# Patient Record
Sex: MALE | Race: BLACK OR AFRICAN AMERICAN | NOT HISPANIC OR LATINO | Employment: FULL TIME | URBAN - METROPOLITAN AREA
[De-identification: names, ages, dates, MRNs, and addresses within clinical notes are randomized per-mention and may not be internally consistent; named-entity substitution may affect disease eponyms.]

---

## 2022-10-03 ENCOUNTER — HOSPITAL ENCOUNTER (EMERGENCY)
Facility: HOSPITAL | Age: 22
Discharge: HOME | End: 2022-10-03
Attending: FAMILY MEDICINE
Payer: MEDICAID

## 2022-10-03 VITALS
HEART RATE: 65 BPM | SYSTOLIC BLOOD PRESSURE: 145 MMHG | TEMPERATURE: 99 F | OXYGEN SATURATION: 100 % | RESPIRATION RATE: 23 BRPM | DIASTOLIC BLOOD PRESSURE: 96 MMHG

## 2022-10-03 DIAGNOSIS — R55 SYNCOPE AND COLLAPSE: ICD-10-CM

## 2022-10-03 LAB
ALBUMIN SERPL-MCNC: 4.7 GM/DL (ref 3.5–5)
ALBUMIN/GLOB SERPL: 1.4 RATIO (ref 1.1–2)
ALP SERPL-CCNC: 67 UNIT/L (ref 40–150)
ALT SERPL-CCNC: 24 UNIT/L (ref 0–55)
AMPHET UR QL SCN: NEGATIVE
APPEARANCE UR: CLEAR
AST SERPL-CCNC: 38 UNIT/L (ref 5–34)
BACTERIA #/AREA URNS AUTO: ABNORMAL /HPF
BARBITURATE SCN PRESENT UR: NEGATIVE
BASOPHILS # BLD AUTO: 0.03 X10(3)/MCL (ref 0–0.2)
BASOPHILS NFR BLD AUTO: 0.5 %
BENZODIAZ UR QL SCN: NEGATIVE
BILIRUB UR QL STRIP.AUTO: NEGATIVE MG/DL
BILIRUBIN DIRECT+TOT PNL SERPL-MCNC: 1.8 MG/DL
BUN SERPL-MCNC: 11.9 MG/DL (ref 8.9–20.6)
CALCIUM SERPL-MCNC: 10.1 MG/DL (ref 8.4–10.2)
CANNABINOIDS UR QL SCN: POSITIVE
CHLORIDE SERPL-SCNC: 99 MMOL/L (ref 98–107)
CK MB SERPL-MCNC: 1 NG/ML
CK SERPL-CCNC: 588 U/L (ref 30–200)
CO2 SERPL-SCNC: 26 MMOL/L (ref 22–29)
COCAINE UR QL SCN: NEGATIVE
COLOR UR AUTO: YELLOW
CREAT SERPL-MCNC: 1.11 MG/DL (ref 0.73–1.18)
EOSINOPHIL # BLD AUTO: 0.04 X10(3)/MCL (ref 0–0.9)
EOSINOPHIL NFR BLD AUTO: 0.7 %
ERYTHROCYTE [DISTWIDTH] IN BLOOD BY AUTOMATED COUNT: 12.9 % (ref 11.5–17)
ETHANOL SERPL-MCNC: 15 MG/DL
FENTANYL UR QL SCN: NEGATIVE
GFR SERPLBLD CREATININE-BSD FMLA CKD-EPI: >60 MLS/MIN/1.73/M2
GLOBULIN SER-MCNC: 3.3 GM/DL (ref 2.4–3.5)
GLUCOSE SERPL-MCNC: 86 MG/DL (ref 74–100)
GLUCOSE UR QL STRIP.AUTO: NORMAL MG/DL
HCT VFR BLD AUTO: 42 % (ref 42–52)
HGB BLD-MCNC: 14.7 GM/DL (ref 14–18)
HYALINE CASTS #/AREA URNS LPF: ABNORMAL /LPF
IMM GRANULOCYTES # BLD AUTO: 0.01 X10(3)/MCL (ref 0–0.04)
IMM GRANULOCYTES NFR BLD AUTO: 0.2 %
KETONES UR QL STRIP.AUTO: ABNORMAL MG/DL
LEUKOCYTE ESTERASE UR QL STRIP.AUTO: NEGATIVE UNIT/L
LYMPHOCYTES # BLD AUTO: 2.56 X10(3)/MCL (ref 0.6–4.6)
LYMPHOCYTES NFR BLD AUTO: 44.4 %
MCH RBC QN AUTO: 30.8 PG (ref 27–31)
MCHC RBC AUTO-ENTMCNC: 35 MG/DL (ref 33–36)
MCV RBC AUTO: 87.9 FL (ref 80–94)
MDMA UR QL SCN: NEGATIVE
MONOCYTES # BLD AUTO: 0.42 X10(3)/MCL (ref 0.1–1.3)
MONOCYTES NFR BLD AUTO: 7.3 %
MUCOUS THREADS URNS QL MICRO: ABNORMAL /LPF
NEUTROPHILS # BLD AUTO: 2.7 X10(3)/MCL (ref 2.1–9.2)
NEUTROPHILS NFR BLD AUTO: 46.9 %
NITRITE UR QL STRIP.AUTO: NEGATIVE
NRBC BLD AUTO-RTO: 0 %
OPIATES UR QL SCN: NEGATIVE
PCP UR QL: NEGATIVE
PH UR STRIP.AUTO: 6.5 [PH]
PH UR: 6.5 [PH] (ref 3–11)
PLATELET # BLD AUTO: 208 X10(3)/MCL (ref 130–400)
PMV BLD AUTO: 9.8 FL (ref 7.4–10.4)
POCT GLUCOSE: 70 MG/DL (ref 70–110)
POTASSIUM SERPL-SCNC: 3.1 MMOL/L (ref 3.5–5.1)
PROT SERPL-MCNC: 8 GM/DL (ref 6.4–8.3)
PROT UR QL STRIP.AUTO: ABNORMAL MG/DL
RBC # BLD AUTO: 4.78 X10(6)/MCL (ref 4.7–6.1)
RBC #/AREA URNS AUTO: ABNORMAL /HPF
RBC UR QL AUTO: ABNORMAL UNIT/L
SODIUM SERPL-SCNC: 140 MMOL/L (ref 136–145)
SP GR UR STRIP.AUTO: 1.02
SPECIFIC GRAVITY, URINE AUTO (.000) (OHS): 1.02 (ref 1–1.03)
SQUAMOUS #/AREA URNS LPF: ABNORMAL /HPF
TROPONIN I SERPL-MCNC: <0.01 NG/ML (ref 0–0.04)
UROBILINOGEN UR STRIP-ACNC: ABNORMAL MG/DL
WBC # SPEC AUTO: 5.8 X10(3)/MCL (ref 4.5–11.5)
WBC #/AREA URNS AUTO: ABNORMAL /HPF

## 2022-10-03 PROCEDURE — 81001 URINALYSIS AUTO W/SCOPE: CPT | Performed by: NURSE PRACTITIONER

## 2022-10-03 PROCEDURE — 82550 ASSAY OF CK (CPK): CPT | Performed by: NURSE PRACTITIONER

## 2022-10-03 PROCEDURE — 96361 HYDRATE IV INFUSION ADD-ON: CPT

## 2022-10-03 PROCEDURE — 82553 CREATINE MB FRACTION: CPT | Performed by: NURSE PRACTITIONER

## 2022-10-03 PROCEDURE — 63600175 PHARM REV CODE 636 W HCPCS: Performed by: PHYSICIAN ASSISTANT

## 2022-10-03 PROCEDURE — 99285 EMERGENCY DEPT VISIT HI MDM: CPT | Mod: 25

## 2022-10-03 PROCEDURE — 84484 ASSAY OF TROPONIN QUANT: CPT | Performed by: NURSE PRACTITIONER

## 2022-10-03 PROCEDURE — 96374 THER/PROPH/DIAG INJ IV PUSH: CPT

## 2022-10-03 PROCEDURE — 82962 GLUCOSE BLOOD TEST: CPT | Mod: 59

## 2022-10-03 PROCEDURE — 25000003 PHARM REV CODE 250: Performed by: PHYSICIAN ASSISTANT

## 2022-10-03 PROCEDURE — 93005 ELECTROCARDIOGRAM TRACING: CPT

## 2022-10-03 PROCEDURE — 25000003 PHARM REV CODE 250: Performed by: NURSE PRACTITIONER

## 2022-10-03 PROCEDURE — 85025 COMPLETE CBC W/AUTO DIFF WBC: CPT | Performed by: NURSE PRACTITIONER

## 2022-10-03 PROCEDURE — 80307 DRUG TEST PRSMV CHEM ANLYZR: CPT | Performed by: NURSE PRACTITIONER

## 2022-10-03 PROCEDURE — 82077 ASSAY SPEC XCP UR&BREATH IA: CPT | Performed by: NURSE PRACTITIONER

## 2022-10-03 PROCEDURE — 80053 COMPREHEN METABOLIC PANEL: CPT | Performed by: NURSE PRACTITIONER

## 2022-10-03 PROCEDURE — 36415 COLL VENOUS BLD VENIPUNCTURE: CPT | Performed by: NURSE PRACTITIONER

## 2022-10-03 RX ORDER — POTASSIUM CHLORIDE 20 MEQ/1
40 TABLET, EXTENDED RELEASE ORAL
Status: COMPLETED | OUTPATIENT
Start: 2022-10-03 | End: 2022-10-03

## 2022-10-03 RX ORDER — DICLOFENAC SODIUM 50 MG/1
50 TABLET, DELAYED RELEASE ORAL 2 TIMES DAILY PRN
Qty: 20 TABLET | Refills: 0 | Status: SHIPPED | OUTPATIENT
Start: 2022-10-03 | End: 2023-02-16 | Stop reason: SDUPTHER

## 2022-10-03 RX ORDER — KETOROLAC TROMETHAMINE 30 MG/ML
15 INJECTION, SOLUTION INTRAMUSCULAR; INTRAVENOUS
Status: COMPLETED | OUTPATIENT
Start: 2022-10-03 | End: 2022-10-03

## 2022-10-03 RX ADMIN — POTASSIUM CHLORIDE 40 MEQ: 1500 TABLET, EXTENDED RELEASE ORAL at 11:10

## 2022-10-03 RX ADMIN — KETOROLAC TROMETHAMINE 15 MG: 30 INJECTION, SOLUTION INTRAMUSCULAR; INTRAVENOUS at 11:10

## 2022-10-03 RX ADMIN — SODIUM CHLORIDE 1000 ML: 9 INJECTION, SOLUTION INTRAVENOUS at 08:10

## 2022-10-04 NOTE — DISCHARGE INSTRUCTIONS
Returns to the ED immediately with any concerning symptoms.  Follow-up with your primary care provider within 1-2 days.

## 2022-10-04 NOTE — ED PROVIDER NOTES
"Encounter Date: 10/3/2022       History     Chief Complaint   Patient presents with    Loss of Consciousness     Friend reports patient had a 30 second- 1 min episode of syncope while riding in his car. Denies drug use; light alcohol use tonight. Reports being "hungover" all day yesterday and did not eat. CBG assessed in triage.     Pt is a 22 y.o. male who presents to the [facility] ED after experiencing approx 1 minute of "being out" per bystander. Pt was riding in his friend's car when issue happened which was within the last 30 minutes. Pt awake, alert. Denies chest pain, SOB, weakness, dizziness, or loss of bowel or bladder control. Pt admits to "heavy" alcohol use a few days ago and has been "hung over."      Review of patient's allergies indicates:  No Known Allergies  History reviewed. No pertinent past medical history.  History reviewed. No pertinent surgical history.  History reviewed. No pertinent family history.  Social History     Tobacco Use    Smoking status: Every Day     Types: Vaping with nicotine    Smokeless tobacco: Never     Review of Systems   Constitutional:  Negative for chills, diaphoresis, fatigue and fever.   HENT:  Negative for facial swelling, postnasal drip, rhinorrhea, sinus pressure, sinus pain, sore throat and trouble swallowing.    Respiratory:  Negative for cough, chest tightness, shortness of breath and wheezing.    Cardiovascular:  Negative for chest pain, palpitations and leg swelling.   Gastrointestinal:  Negative for abdominal pain, diarrhea, nausea and vomiting.   Genitourinary:  Negative for dysuria, flank pain, hematuria and urgency.   Musculoskeletal:  Negative for arthralgias, back pain and myalgias.   Skin:  Negative for color change and rash.   Neurological:  Positive for syncope. Negative for dizziness, weakness and headaches.   Hematological:  Does not bruise/bleed easily.   All other systems reviewed and are negative.    Physical Exam     Initial Vitals   BP Pulse Resp " Temp SpO2   10/03/22 2100 10/03/22 2100 10/03/22 2100 10/03/22 2101 10/03/22 2100   (!) 132/97 63 20 98.9 °F (37.2 °C) 100 %      MAP       --                Physical Exam    Nursing note and vitals reviewed.  Constitutional: Vital signs are normal. He appears well-developed and well-nourished.   HENT:   Head: Normocephalic.   Nose: Nose normal.   Mouth/Throat: Oropharynx is clear and moist.   Eyes: Conjunctivae and EOM are normal. Pupils are equal, round, and reactive to light.   Neck: Neck supple.   Normal range of motion.  Cardiovascular:  Normal rate, regular rhythm, normal heart sounds and intact distal pulses.           Pulmonary/Chest: Effort normal and breath sounds normal. No respiratory distress. He has no wheezes. He has no rhonchi. He has no rales. He exhibits no tenderness.   Abdominal: Abdomen is soft and flat. Bowel sounds are normal. There is no abdominal tenderness. There is no rebound, no guarding, no tenderness at McBurney's point and negative Wahl's sign.   Musculoskeletal:         General: Normal range of motion.      Cervical back: Normal range of motion and neck supple.     Neurological: He is alert and oriented to person, place, and time. He has normal strength.   Skin: Skin is warm and dry. Capillary refill takes less than 2 seconds.   Psychiatric: He has a normal mood and affect. His behavior is normal. Judgment and thought content normal.       ED Course   Procedures  Labs Reviewed   COMPREHENSIVE METABOLIC PANEL - Abnormal; Notable for the following components:       Result Value    Potassium Level 3.1 (*)     Bilirubin Total 1.8 (*)     Aspartate Aminotransferase 38 (*)     All other components within normal limits   URINALYSIS, REFLEX TO URINE CULTURE - Abnormal; Notable for the following components:    Protein, UA 1+ (*)     Ketones, UA 1+ (*)     Blood, UA 1+ (*)     Urobilinogen, UA 1+ (*)     Mucous, UA Occ (*)     All other components within normal limits   DRUG SCREEN, URINE  (BEAKER) - Abnormal; Notable for the following components:    Cannabinoids, Urine Positive (*)     All other components within normal limits    Narrative:     Cut off concentrations:    Amphetamines - 1000 ng/ml  Barbiturates - 200 ng/ml  Benzodiazepine - 200 ng/ml  Cannabinoids (THC) - 50 ng/ml  Cocaine - 300 ng/ml  Fentanyl - 1.0 ng/ml  MDMA - 500 ng/ml  Opiates - 300 ng/ml   Phencyclidine (PCP) - 25 ng/ml    Specimen submitted for drug analysis and tested for pH and specific gravity in order to evaluate sample integrity. Suspect tampering if specific gravity is <1.003 and/or pH is not within the range of 4.5 - 8.0  False negatives may result form substances such as bleach added to urine.  False positives may result for the presence of a substance with similar chemical structure to the drug or its metabolite.    This test provides only a PRELIMINARY analytical test result. A more specific alternate chemical method must be used in order to obtain a confirmed analytical result. Gas chromatography/mass spectrometry (GC/MS) is the preferred confirmatory method. Other chemical confirmation methods are available. Clinical consideration and professional judgement should be applied to any drug of abuse test result, particularly when preliminary positive results are used.    Positive results will be confirmed only at the physicians request. Unconfirmed screening results are to be used only for medical purposes (treatment).        ALCOHOL,MEDICAL (ETHANOL) - Abnormal; Notable for the following components:    Ethanol Level 15.0 (*)     All other components within normal limits   CK - Abnormal; Notable for the following components:    Creatine Kinase 588 (*)     All other components within normal limits   TROPONIN I - Normal   CK-MB - Normal   CBC W/ AUTO DIFFERENTIAL    Narrative:     The following orders were created for panel order CBC auto differential.  Procedure                               Abnormality         Status                      ---------                               -----------         ------                     CBC with Differential[[id number]]                            Final result                 Please view results for these tests on the individual orders.   CBC WITH DIFFERENTIAL   EXTRA TUBES    Narrative:     The following orders were created for panel order EXTRA TUBES.  Procedure                               Abnormality         Status                     ---------                               -----------         ------                     Light Blue Top Hold[[id number]]                              In process                   Please view results for these tests on the individual orders.   LIGHT BLUE TOP HOLD   POCT GLUCOSE        ECG Results              EKG 12-lead (Final result)  Result time 10/04/22 16:13:24      Final result by Interface, Lab [facility] (10/04/22 16:13:24)                   Narrative:    Test Reason : R55,    Vent. Rate : 059 BPM     Atrial Rate : 059 BPM     P-R Int : 160 ms          QRS Dur : 074 ms      QT Int : 376 ms       P-R-T Axes : -07 065 050 degrees     QTc Int : 372 ms    Sinus bradycardia  Moderate voltage criteria for LVH, may be normal variant  Borderline Abnormal ECG  No previous ECGs available  Confirmed by Sharon Bridges MD ([id number]) on 10/4/2022 4:13:15 PM    Referred By: [email or web address]   SELF           Confirmed By:Sharon Bridges MD                                  Imaging Results              CT Head Without Contrast (Final result)  Result time 10/03/22 21:20:53      Final result by Ten Barrientos MD (10/03/22 21:20:53)                   Impression:      No acute abnormalities are seen      Electronically signed by: Ten Barrientos MD  Date:    10/03/2022  Time:    21:20               Narrative:    EXAMINATION:  CT HEAD WITHOUT CONTRAST    CLINICAL HISTORY:  Mental status change, unknown cause;    TECHNIQUE:  Low dose axial images were obtained through the head.  Coronal and  sagittal reformations were also performed. Contrast was not administered.    Automatic exposure control (AEC) was utilized for dose reduction.    Dose: 1073 mGycm    COMPARISON:  None.    FINDINGS:  Ventricles of normal size and shape there is no shift of the midline noted.  There are no extra-axial fluid collections or areas consistent with hemorrhage noted.  No masses is seen no acute infarcts are noted.  The calvarium appears intact.                                       Medications   sodium chloride 0.9% bolus 1,000 mL (0 mLs Intravenous Stopped 10/3/22 2159)   ketorolac injection 15 mg (15 mg Intravenous Given 10/3/22 2327)   potassium chloride SA CR tablet 40 mEq (40 mEq Oral Given 10/3/22 2327)     Medical Decision Making:   Differential Diagnosis:   Syncope  Electrolyte imbalance  AMI  Drug abuse  ETOH use  Clinical Tests:   Lab Tests: Reviewed and Ordered  Radiological Study: Ordered and Reviewed  Medical Tests: Ordered and Reviewed  ED Management:  The patient is resting comfortably and in no acute distress.  He states that his symptoms have improved after treatment in Emergency Department. I personally discussed his test results and treatment plan.  Gave strict ED precautions and specific conditions for return to the emergency department and importance of follow up with pcp.  Patient voices understanding and agrees to the plan discussed. All patients' questions have been answered at this time. He has remained hemodynamically stable throughout entire stay in ED and is stable for discharge home.            ED Course as of 10/04/22 2106   Mon Oct 03, 2022   2044 Pt transitioned to GLENDA ARCHIBALD at this time. [JA]   2140 AST(!): 38 [ER]   2140 CT Head Without Contrast  No acute abnormalities are seen [ER]      ED Course User Index  [ER] CRISTELA Poole  [JA] Glen Vilchis Jr., FNP                 Clinical Impression:   Final diagnoses:  [R55] Syncope and collapse      ED Disposition Condition    Discharge  Stable          ED Prescriptions       Medication Sig Dispense Start Date End Date Auth. Provider    diclofenac (VOLTAREN) 50 MG EC tablet Take 1 tablet (50 mg total) by mouth 2 (two) times daily as needed (pain). 20 tablet 10/3/2022 -- CRISTELA Poole          Follow-up Information       Follow up With Specialties Details Why Contact Info    [facility] - Emergency Dept Emergency Medicine  As needed, If symptoms worsen [address] [facility] [address] [phone]             CRISTELA Poole  10/04/22 2852

## 2023-02-16 ENCOUNTER — HOSPITAL ENCOUNTER (EMERGENCY)
Facility: HOSPITAL | Age: 23
Discharge: HOME | End: 2023-02-16
Attending: EMERGENCY MEDICINE
Payer: MEDICAID

## 2023-02-16 VITALS
DIASTOLIC BLOOD PRESSURE: 85 MMHG | TEMPERATURE: 98 F | HEIGHT: 77 IN | WEIGHT: 152.13 LBS | OXYGEN SATURATION: 100 % | HEART RATE: 61 BPM | RESPIRATION RATE: 16 BRPM | BODY MASS INDEX: 17.96 KG/M2 | SYSTOLIC BLOOD PRESSURE: 129 MMHG

## 2023-02-16 DIAGNOSIS — R55 SYNCOPE: Primary | ICD-10-CM

## 2023-02-16 LAB
ALBUMIN SERPL-MCNC: 4.6 G/DL (ref 3.5–5)
ALBUMIN/GLOB SERPL: 1.3 RATIO (ref 1.1–2)
ALP SERPL-CCNC: 77 UNIT/L (ref 40–150)
ALT SERPL-CCNC: 31 UNIT/L (ref 0–55)
AST SERPL-CCNC: 54 UNIT/L (ref 5–34)
BASOPHILS # BLD AUTO: 0.03 X10(3)/MCL (ref 0–0.2)
BASOPHILS NFR BLD AUTO: 0.6 %
BILIRUBIN DIRECT+TOT PNL SERPL-MCNC: 1.1 MG/DL
BNP BLD-MCNC: <10 PG/ML
BUN SERPL-MCNC: 11.4 MG/DL (ref 8.9–20.6)
CALCIUM SERPL-MCNC: 9.8 MG/DL (ref 8.4–10.2)
CHLORIDE SERPL-SCNC: 102 MMOL/L (ref 98–107)
CO2 SERPL-SCNC: 25 MMOL/L (ref 22–29)
CREAT SERPL-MCNC: 1.06 MG/DL (ref 0.73–1.18)
EOSINOPHIL # BLD AUTO: 0.04 X10(3)/MCL (ref 0–0.9)
EOSINOPHIL NFR BLD AUTO: 0.8 %
ERYTHROCYTE [DISTWIDTH] IN BLOOD BY AUTOMATED COUNT: 12.9 % (ref 11.5–17)
GFR SERPLBLD CREATININE-BSD FMLA CKD-EPI: >60 MLS/MIN/1.73/M2
GLOBULIN SER-MCNC: 3.5 GM/DL (ref 2.4–3.5)
GLUCOSE SERPL-MCNC: 87 MG/DL (ref 74–100)
HCT VFR BLD AUTO: 42.6 % (ref 42–52)
HGB BLD-MCNC: 14.9 GM/DL (ref 14–18)
IMM GRANULOCYTES # BLD AUTO: 0.01 X10(3)/MCL (ref 0–0.04)
IMM GRANULOCYTES NFR BLD AUTO: 0.2 %
LYMPHOCYTES # BLD AUTO: 1.13 X10(3)/MCL (ref 0.6–4.6)
LYMPHOCYTES NFR BLD AUTO: 22.2 %
MAGNESIUM SERPL-MCNC: 1.9 MG/DL (ref 1.6–2.6)
MCH RBC QN AUTO: 32 PG
MCHC RBC AUTO-ENTMCNC: 35 MG/DL (ref 33–36)
MCV RBC AUTO: 91.4 FL (ref 80–94)
MONOCYTES # BLD AUTO: 0.25 X10(3)/MCL (ref 0.1–1.3)
MONOCYTES NFR BLD AUTO: 4.9 %
NEUTROPHILS # BLD AUTO: 3.63 X10(3)/MCL (ref 2.1–9.2)
NEUTROPHILS NFR BLD AUTO: 71.3 %
NRBC BLD AUTO-RTO: 0 %
PLATELET # BLD AUTO: 234 X10(3)/MCL (ref 130–400)
PMV BLD AUTO: 10.2 FL (ref 7.4–10.4)
POTASSIUM SERPL-SCNC: 3.8 MMOL/L (ref 3.5–5.1)
PROT SERPL-MCNC: 8.1 GM/DL (ref 6.4–8.3)
RBC # BLD AUTO: 4.66 X10(6)/MCL (ref 4.7–6.1)
SODIUM SERPL-SCNC: 140 MMOL/L (ref 136–145)
TROPONIN I SERPL-MCNC: <0.01 NG/ML (ref 0–0.04)
WBC # SPEC AUTO: 5.1 X10(3)/MCL (ref 4.5–11.5)

## 2023-02-16 PROCEDURE — 99285 EMERGENCY DEPT VISIT HI MDM: CPT | Mod: 25

## 2023-02-16 PROCEDURE — 83735 ASSAY OF MAGNESIUM: CPT | Performed by: PHYSICIAN ASSISTANT

## 2023-02-16 PROCEDURE — 93005 ELECTROCARDIOGRAM TRACING: CPT

## 2023-02-16 PROCEDURE — 84484 ASSAY OF TROPONIN QUANT: CPT | Performed by: PHYSICIAN ASSISTANT

## 2023-02-16 PROCEDURE — 80053 COMPREHEN METABOLIC PANEL: CPT | Performed by: PHYSICIAN ASSISTANT

## 2023-02-16 PROCEDURE — 85025 COMPLETE CBC W/AUTO DIFF WBC: CPT | Performed by: PHYSICIAN ASSISTANT

## 2023-02-16 PROCEDURE — 83880 ASSAY OF NATRIURETIC PEPTIDE: CPT | Performed by: PHYSICIAN ASSISTANT

## 2023-02-16 RX ORDER — DICLOFENAC SODIUM 50 MG/1
50 TABLET, DELAYED RELEASE ORAL 2 TIMES DAILY PRN
Qty: 20 TABLET | Refills: 0 | Status: SHIPPED | OUTPATIENT
Start: 2023-02-16

## 2023-02-16 NOTE — ED PROVIDER NOTES
Encounter Date: 2/16/2023       History     Chief Complaint   Patient presents with    Headache     PT W CO HA, SOB, BODY ACHES AND WEAKNESS X 2 HRS. STATES GOT DRUNK LAST NIGHT.  NO FEVER OR COUGH.  VSS.      21 yo M w/ no significant PMHx presents to ED c/o unwitnessed syncopal episode w/ associated HA, SOB, body aches & generalized weakness. Patient reports getting drunk last night, but does not believe his symptoms are related to that. Reports he is unsure how long he was unconscious, but upon waking up he called his friend who brought him to ED. Has small abrasion to L anterior knee which he reports happened w/ syncopal episode. Denies pain or known injury at any other body sites. Denies vision changes, slurred speech, facial drooping, vertigo, lightheadedness, neck stiffness, focal weakness, paralysis, paresthesia, numbness, ataxia, abnormal balance, seizure, CP, palpitations, diaphoresis, cough, hemoptysis, wheezing, congestion, rhinorrhea, sore throat, F/C, abdominal pain, N/V/D, blood in stool, back pain. VSS on arrival, patient in NAD.    Review of patient's allergies indicates:  No Known Allergies  History reviewed. No pertinent past medical history.  History reviewed. No pertinent surgical history.  History reviewed. No pertinent family history.  Social History     Tobacco Use    Smoking status: Every Day     Types: Vaping with nicotine    Smokeless tobacco: Never   Substance Use Topics    Alcohol use: Yes     Review of Systems   All other systems reviewed and are negative.    Physical Exam     Initial Vitals [02/16/23 1452]   BP Pulse Resp Temp SpO2   129/85 61 16 98.4 °F (36.9 °C) 100 %      MAP       --         Physical Exam    Nursing note and vitals reviewed.  Constitutional: He appears well-developed and well-nourished. He is not diaphoretic. No distress.   HENT:   Head: Normocephalic and atraumatic. Head is without raccoon's eyes, without Shah's sign, without abrasion, without contusion and  without laceration.   Right Ear: No drainage. No hemotympanum.   Left Ear: No drainage. No hemotympanum.   Nose: No rhinorrhea. No epistaxis.   Mouth/Throat: No oropharyngeal exudate.   Eyes: Conjunctivae and EOM are normal. Pupils are equal, round, and reactive to light. No scleral icterus.   Neck: Neck supple. No JVD present.   Normal range of motion.   Full passive range of motion without pain.     Cardiovascular:  Normal rate, regular rhythm, normal heart sounds and intact distal pulses.     Exam reveals no gallop and no friction rub.       No murmur heard.  Pulmonary/Chest: Breath sounds normal. No respiratory distress. He has no wheezes. He has no rhonchi. He has no rales.   Abdominal: Abdomen is soft. Bowel sounds are normal. He exhibits no distension. There is no abdominal tenderness. There is no rebound and no guarding.   Musculoskeletal:         General: No edema. Normal range of motion.      Cervical back: Normal, full passive range of motion without pain, normal range of motion and neck supple. No rigidity. Normal range of motion.      Thoracic back: Normal.      Lumbar back: Normal.      Left knee: No swelling, deformity, effusion, erythema, ecchymosis, lacerations, bony tenderness or crepitus. Normal range of motion. Tenderness (mild at site of abrasion) present. Normal alignment.      Comments: Small abrasion to anterior L knee     Lymphadenopathy:     He has no cervical adenopathy.   Neurological: He is alert and oriented to person, place, and time. He has normal strength. He is not disoriented. He displays no tremor. No cranial nerve deficit or sensory deficit. He exhibits normal muscle tone. He displays a negative Romberg sign. He displays no seizure activity. Coordination and gait normal. GCS eye subscore is 4. GCS verbal subscore is 5. GCS motor subscore is 6.   Skin: Skin is warm and dry. Capillary refill takes less than 2 seconds. No rash noted. No erythema. No pallor.   Psychiatric: He has a  normal mood and affect.       ED Course   Procedures  Labs Reviewed   COMPREHENSIVE METABOLIC PANEL - Abnormal; Notable for the following components:       Result Value    Aspartate Aminotransferase 54 (*)     All other components within normal limits   CBC WITH DIFFERENTIAL - Abnormal; Notable for the following components:    RBC 4.66 (*)     All other components within normal limits   MAGNESIUM - Normal   B-TYPE NATRIURETIC PEPTIDE - Normal   TROPONIN I - Normal   CBC W/ AUTO DIFFERENTIAL    Narrative:     The following orders were created for panel order CBC Auto Differential.  Procedure                               Abnormality         Status                     ---------                               -----------         ------                     CBC with Differential[[id number]]        Abnormal            Final result                 Please view results for these tests on the individual orders.   EXTRA TUBES    Narrative:     The following orders were created for panel order EXTRA TUBES.  Procedure                               Abnormality         Status                     ---------                               -----------         ------                     Light Blue Top Hold[[id number]]                              In process                 Gold Top Hold[[id number]]                                    In process                   Please view results for these tests on the individual orders.   LIGHT BLUE TOP HOLD   GOLD TOP HOLD     EKG Readings: (Independently Interpreted)   Initial Reading: No STEMI. Previous EKG: Compared with most recent EKG Previous EKG Date: 10/3/22. Rhythm: Normal Sinus Rhythm. Heart Rate: 60. Ectopy: No Ectopy. Conduction: Normal. ST Segments: Normal ST Segments. Axis: Normal. Clinical Impression: Normal Sinus Rhythm     Imaging Results              X-Ray Chest 1 View (Final result)  Result time 02/16/23 16:09:59      Final result by Micaela Montenegro MD (02/16/23 16:09:59)                    Impression:      No acute cardiopulmonary abnormality.      Electronically signed by: Micaela Montenegro  Date:    02/16/2023  Time:    16:09               Narrative:    EXAMINATION:  XR CHEST 1 VIEW    CLINICAL HISTORY:  SOB;    TECHNIQUE:  Single frontal view of the chest was performed.    COMPARISON:  None    FINDINGS:  LINES AND TUBES: None    MEDIASTINUM AND CORRIE: The cardiac silhouette is normal.    LUNGS: No lobar consolidation. No edema.    PLEURA:No pleural effusion. No pneumothorax.    OTHER: No acute osseous abnormality.                                       Medications - No data to display  Medical Decision Making:   Clinical Tests:   Lab Tests: Ordered and Reviewed  Radiological Study: Ordered and Reviewed  Medical Tests: Ordered and Reviewed  Today's workup wholly unremarkable. EKG non-ischemic. Troponin WNL. BNP WNL. Low-risk HEART & DEYNAIRA scores, no indication for admission for ACS workup. PERC score of 0, no indication for PE workup. CBC unremarkable w/o significant anemia. CMP & Mg all WNL. Although patient reported SOB in triage, he has SpO2 of 100% w/o tachycardia, tachypnea or physical exam consistent w/ SOB. Patient does not meet criteria for admission under SF Syncope rules. Patient has benign neuro exam w/o focal deficits or meningeal signs, no indication for head imaging at this time. Patient non-toxic appearing w/ stable vitals, stable for discharge from ED. Will refer patient to IM clinic to establish PCP. Will discharge w/ NSAID for symptom relief & discussed importance of good diet & hydration. Discussed use of EtOH in a responsible manner. ED precautions given for new or worsening symptoms.  Additional MDM:   PERC Rule:   Age is greater than or equal to 50 = 0.0  Heart Rate is greater than or equal to 100 = 0.0  SaO2 on room air < 95% = 0.0  Unilateral leg swelling = 0.0  Hemoptysis = 0.0  Recent surgery or trauma = 0.0  Prior PE or DVT =  0.0  Hormone use = 0.00  PERC Score =  0  Heart Score:    History:          Slightly suspicious.  ECG:             Normal  Age:               Less than 45 years  Risk factors: no risk factors known  Troponin:       Less than or equal to normal limit  Final Score: 0      DEYANIRA Score:   Age over 65:                                    0.00   > or = to 3 CAD risk factors:           0.00  Established CAD:                            0.00  > or = to 2 anginal events in the past 24 hours: 0.00  Use of ASA in past 7 days:              0.00  Elevated Enzymes:                         0.00  ST Depression > or = to 0.05 mV:  0.00  DEYANIRA score: 0                     Clinical Impression:   Final diagnoses:  [R55] Syncope (Primary)        ED Disposition Condition    Discharge Good          ED Prescriptions       Medication Sig Dispense Start Date End Date Auth. Provider    diclofenac (VOLTAREN) 50 MG EC tablet Take 1 tablet (50 mg total) by mouth 2 (two) times daily as needed (pain). 20 tablet 2/16/2023 -- CRISTELA Fox          Follow-up Information       Follow up With Specialties Details Why Contact Info Additional Information    [facility] - Internal Medicine Internal Medicine In 2 weeks  [address] [facility] [address] [phone] Internal Medicine Clinic Entrance #1    [facility] - Emergency Dept Emergency Medicine  As needed, If symptoms worsen [address] [facility] [address] [phone]              CRISTELA Fox  02/16/23 7554

## 2023-02-16 NOTE — DISCHARGE INSTRUCTIONS
Report to Emergency Department if symptoms return or worsen; [facility] - Medicine Clinic Within 1 to 2 days, It is important that you follow up with your primary care provider or specialist if indicated for further evaluation, workup, and treatment as necessary. The exam and treatment you received in Emergency Department was for an urgent problem and NOT INTENDED AS COMPLETE CARE. It is important that you FOLLOW UP with a doctor for ongoing care. If your symptoms become WORSE or you DO NOT IMPROVE and you are unable to reach your health care provider, you should RETURN to the Emergency Department. The Emergency Department provider has provided a PRELIMINARY INTERPRETATION of all your studies. A final interpretation may be done after you are discharged. If a change in your diagnosis or treatment is needed WE WILL CONTACT YOU. It is critical that we have a CURRENT PHONE NUMBER FOR YOU.

## 2023-02-16 NOTE — Clinical Note
"Luis Humphreysjaguar Salcido was seen and treated in our emergency department on 2/16/2023.  He may return to work on 02/18/2023.       If you have any questions or concerns, please don't hesitate to call.      CRISTELA Fox"

## 2023-08-04 ENCOUNTER — HOSPITAL ENCOUNTER (EMERGENCY)
Facility: HOSPITAL | Age: 23
Discharge: LEFT BEFORE DISCHARGE | End: 2023-08-04
Attending: INTERNAL MEDICINE
Payer: MEDICAID

## 2023-08-04 VITALS
HEIGHT: 74 IN | RESPIRATION RATE: 20 BRPM | SYSTOLIC BLOOD PRESSURE: 139 MMHG | DIASTOLIC BLOOD PRESSURE: 74 MMHG | OXYGEN SATURATION: 99 % | WEIGHT: 158.75 LBS | HEART RATE: 68 BPM | TEMPERATURE: 98 F | BODY MASS INDEX: 20.37 KG/M2

## 2023-08-04 DIAGNOSIS — F12.10 MARIJUANA ABUSE: ICD-10-CM

## 2023-08-04 DIAGNOSIS — R53.83 FATIGUE, UNSPECIFIED TYPE: Primary | ICD-10-CM

## 2023-08-04 LAB
ALBUMIN SERPL-MCNC: 4.2 G/DL (ref 3.5–5)
ALBUMIN/GLOB SERPL: 1.2 RATIO (ref 1.1–2)
ALP SERPL-CCNC: 73 UNIT/L (ref 40–150)
ALT SERPL-CCNC: 16 UNIT/L (ref 0–55)
AST SERPL-CCNC: 26 UNIT/L (ref 5–34)
BASOPHILS # BLD AUTO: 0.03 X10(3)/MCL
BASOPHILS NFR BLD AUTO: 0.7 %
BILIRUBIN DIRECT+TOT PNL SERPL-MCNC: 1.2 MG/DL
BUN SERPL-MCNC: 17 MG/DL (ref 8.9–20.6)
CALCIUM SERPL-MCNC: 9.3 MG/DL (ref 8.4–10.2)
CHLORIDE SERPL-SCNC: 105 MMOL/L (ref 98–107)
CO2 SERPL-SCNC: 25 MMOL/L (ref 22–29)
CREAT SERPL-MCNC: 1.06 MG/DL (ref 0.73–1.18)
EOSINOPHIL # BLD AUTO: 0.14 X10(3)/MCL (ref 0–0.9)
EOSINOPHIL NFR BLD AUTO: 3.2 %
ERYTHROCYTE [DISTWIDTH] IN BLOOD BY AUTOMATED COUNT: 13.8 % (ref 11.5–17)
GFR SERPLBLD CREATININE-BSD FMLA CKD-EPI: >60 MLS/MIN/1.73/M2
GLOBULIN SER-MCNC: 3.4 GM/DL (ref 2.4–3.5)
GLUCOSE SERPL-MCNC: 80 MG/DL (ref 74–100)
HCT VFR BLD AUTO: 41.9 % (ref 42–52)
HGB BLD-MCNC: 14.2 G/DL (ref 14–18)
IMM GRANULOCYTES # BLD AUTO: 0.01 X10(3)/MCL (ref 0–0.04)
IMM GRANULOCYTES NFR BLD AUTO: 0.2 %
LYMPHOCYTES # BLD AUTO: 1.21 X10(3)/MCL (ref 0.6–4.6)
LYMPHOCYTES NFR BLD AUTO: 28.1 %
MCH RBC QN AUTO: 30.6 PG (ref 27–31)
MCHC RBC AUTO-ENTMCNC: 33.9 G/DL (ref 33–36)
MCV RBC AUTO: 90.3 FL (ref 80–94)
MONOCYTES # BLD AUTO: 0.32 X10(3)/MCL (ref 0.1–1.3)
MONOCYTES NFR BLD AUTO: 7.4 %
NEUTROPHILS # BLD AUTO: 2.6 X10(3)/MCL (ref 2.1–9.2)
NEUTROPHILS NFR BLD AUTO: 60.4 %
NRBC BLD AUTO-RTO: 0 %
PLATELET # BLD AUTO: 208 X10(3)/MCL (ref 130–400)
PMV BLD AUTO: 9.6 FL (ref 7.4–10.4)
POTASSIUM SERPL-SCNC: 3.9 MMOL/L (ref 3.5–5.1)
PROT SERPL-MCNC: 7.6 GM/DL (ref 6.4–8.3)
RBC # BLD AUTO: 4.64 X10(6)/MCL (ref 4.7–6.1)
SODIUM SERPL-SCNC: 139 MMOL/L (ref 136–145)
TSH SERPL-ACNC: 1.1 UIU/ML (ref 0.35–4.94)
WBC # SPEC AUTO: 4.31 X10(3)/MCL (ref 4.5–11.5)

## 2023-08-04 PROCEDURE — 80053 COMPREHEN METABOLIC PANEL: CPT | Performed by: INTERNAL MEDICINE

## 2023-08-04 PROCEDURE — 85025 COMPLETE CBC W/AUTO DIFF WBC: CPT | Performed by: INTERNAL MEDICINE

## 2023-08-04 PROCEDURE — 99283 EMERGENCY DEPT VISIT LOW MDM: CPT

## 2023-08-04 PROCEDURE — 84443 ASSAY THYROID STIM HORMONE: CPT | Performed by: INTERNAL MEDICINE

## 2023-08-05 ENCOUNTER — HOSPITAL ENCOUNTER (EMERGENCY)
Facility: HOSPITAL | Age: 23
Discharge: HOME | End: 2023-08-05
Attending: EMERGENCY MEDICINE
Payer: MEDICAID

## 2023-08-05 VITALS
RESPIRATION RATE: 18 BRPM | SYSTOLIC BLOOD PRESSURE: 139 MMHG | HEART RATE: 65 BPM | OXYGEN SATURATION: 99 % | DIASTOLIC BLOOD PRESSURE: 98 MMHG | HEIGHT: 74 IN | BODY MASS INDEX: 22.07 KG/M2 | WEIGHT: 172 LBS | TEMPERATURE: 98 F

## 2023-08-05 DIAGNOSIS — M54.2 NECK PAIN: Primary | ICD-10-CM

## 2023-08-05 PROCEDURE — 96372 THER/PROPH/DIAG INJ SC/IM: CPT | Performed by: EMERGENCY MEDICINE

## 2023-08-05 PROCEDURE — 63600175 PHARM REV CODE 636 W HCPCS: Performed by: EMERGENCY MEDICINE

## 2023-08-05 PROCEDURE — 99284 EMERGENCY DEPT VISIT MOD MDM: CPT

## 2023-08-05 RX ORDER — ORPHENADRINE CITRATE 100 MG/1
100 TABLET, EXTENDED RELEASE ORAL 2 TIMES DAILY PRN
Qty: 20 TABLET | Refills: 0 | Status: SHIPPED | OUTPATIENT
Start: 2023-08-05 | End: 2023-08-15

## 2023-08-05 RX ORDER — KETOROLAC TROMETHAMINE 10 MG/1
10 TABLET, FILM COATED ORAL EVERY 6 HOURS
Qty: 20 TABLET | Refills: 0 | Status: SHIPPED | OUTPATIENT
Start: 2023-08-05 | End: 2023-08-10

## 2023-08-05 RX ORDER — ORPHENADRINE CITRATE 100 MG/1
100 TABLET, EXTENDED RELEASE ORAL 2 TIMES DAILY PRN
Qty: 20 TABLET | Refills: 0 | Status: SHIPPED | OUTPATIENT
Start: 2023-08-05 | End: 2023-08-05 | Stop reason: SDUPTHER

## 2023-08-05 RX ORDER — ORPHENADRINE CITRATE 100 MG/1
100 TABLET, EXTENDED RELEASE ORAL ONCE
Status: DISCONTINUED | OUTPATIENT
Start: 2023-08-05 | End: 2023-08-05 | Stop reason: HOSPADM

## 2023-08-05 RX ORDER — ORPHENADRINE CITRATE 100 MG/1
100 TABLET, EXTENDED RELEASE ORAL 2 TIMES DAILY
Status: DISCONTINUED | OUTPATIENT
Start: 2023-08-05 | End: 2023-08-05

## 2023-08-05 RX ORDER — KETOROLAC TROMETHAMINE 30 MG/ML
60 INJECTION, SOLUTION INTRAMUSCULAR; INTRAVENOUS
Status: COMPLETED | OUTPATIENT
Start: 2023-08-05 | End: 2023-08-05

## 2023-08-05 RX ADMIN — KETOROLAC TROMETHAMINE 60 MG: 30 INJECTION INTRAMUSCULAR; INTRAVENOUS at 04:08

## 2023-08-05 NOTE — ED PROVIDER NOTES
"Encounter Date: 8/5/2023       History     Chief Complaint   Patient presents with    Neck Pain     Pt c/o neckpain onset two days ago upon awakening. States has limited rom with neck.     23-year-old male states his neck has been hurting for several weeks.  He states he had a panic attack and went to "the emergency room by the cage him known" yesterday.  He states the pain is in the lateral neck bilaterally posteriorly.  There is no radiation into the extremities.  There is no weakness numbness nor tingling.  He denies trauma fevers chills sweats cough cold rhinorrhea sore throat nausea vomiting and diarrhea.  He has no headache.      Review of patient's allergies indicates:  No Known Allergies  No past medical history on file.  No past surgical history on file.  No family history on file.  Social History     Tobacco Use    Smoking status: Every Day     Current packs/day: 0.00     Types: Vaping with nicotine    Smokeless tobacco: Never   Substance Use Topics    Alcohol use: Yes     Review of Systems   Constitutional:  Negative for fever.   HENT:  Negative for sore throat.    Respiratory:  Negative for shortness of breath.    Cardiovascular:  Negative for chest pain.   Gastrointestinal:  Negative for nausea.   Genitourinary:  Negative for dysuria.   Musculoskeletal:  Negative for back pain.   Skin:  Negative for rash.   Neurological:  Negative for weakness.   Hematological:  Does not bruise/bleed easily.   All other systems reviewed and are negative.      Physical Exam     Initial Vitals [08/05/23 1606]   BP Pulse Resp Temp SpO2   (!) 139/98 65 18 97.7 °F (36.5 °C) 99 %      MAP       --         Physical Exam    Nursing note and vitals reviewed.  Constitutional: He appears well-developed.   HENT:   Head: Normocephalic.   Eyes: Conjunctivae are normal.   Neck: Neck supple.   No spine tenderness.  No deformity.  No erythema swelling bruising nor other visible abnormality.   Normal range of motion.  Cardiovascular:  " Normal rate, regular rhythm and normal heart sounds.           Pulmonary/Chest: Breath sounds normal.   Abdominal: Abdomen is soft. Bowel sounds are normal. He exhibits no distension. There is no abdominal tenderness.   Musculoskeletal:         General: No edema.      Cervical back: Normal range of motion and neck supple.     Lymphadenopathy:     He has no cervical adenopathy.   Neurological: He is alert.   Skin: Skin is warm.   Psychiatric: He has a normal mood and affect.         ED Course   Procedures  Labs Reviewed - No data to display       Imaging Results              X-Ray Cervical Spine AP And Lateral (Final result)  Result time 08/05/23 16:54:06      Final result by Carolyn Geiger MD (08/05/23 16:54:06)                   Impression:      No abnormality seen      Electronically signed by: Carolyn Geiger  Date:    08/05/2023  Time:    16:54               Narrative:    EXAMINATION:  XR CERVICAL SPINE AP LATERAL    CLINICAL HISTORY:  Pain, unspecified    TECHNIQUE:  AP, lateral and open mouth views of the cervical spine were performed.    COMPARISON:  None    FINDINGS:  The vertebral body heights and alignment are well maintained.  No fracture is seen.  No dislocation is seen.  Odontoid lateral masses appear grossly unremarkable.  No soft tissue abnormality is seen.                        Wet Read by Lan Hawthorne Jr., MD (08/05/23 16:48:33, [facility] - Emergency Dept, Emergency Medicine)    naf                                     Medications   orphenadrine 12 hr tablet 100 mg (has no administration in time range)   ketorolac injection 60 mg (60 mg Intramuscular Given 8/5/23 4797)     Medical Decision Making:   Differential Diagnosis:   Fracture, meningitis, spinal cord compression  Clinical Tests:   Radiological Study: Ordered and Reviewed  Medical Decision Making  Amount and/or Complexity of Data Reviewed  Radiology: ordered and independent interpretation  performed.    Risk  Prescription drug management.                             Clinical Impression:   Final diagnoses:  [M54.2] Neck pain - initial encounter (Primary)        ED Disposition Condition    Discharge Stable          ED Prescriptions       Medication Sig Dispense Start Date End Date Auth. Provider    ketorolac (TORADOL) 10 mg tablet Take 1 tablet (10 mg total) by mouth every 6 (six) hours. for 5 days 20 tablet 8/5/2023 8/10/2023 Lan Hawthorne Jr., MD    orphenadrine (NORFLEX) 100 mg tablet Take 1 tablet (100 mg total) by mouth 2 (two) times daily as needed for Muscle spasms. 20 tablet 8/5/2023 8/15/2023 Lan Hawthorne Jr., MD          Follow-up Information       Follow up With Specialties Details Why Contact Info    your pcp  In 2 days               Lan Hawthorne Jr., MD  08/05/23 9965

## 2023-08-05 NOTE — ED PROVIDER NOTES
Encounter Date: 8/4/2023       History     Chief Complaint   Patient presents with    Fatigue     Reports weakness and dizziness after smoking marijuana this PM     Presents feeling weak. States was smoking marihuana and felt weak and pass out for 1 minute. Friends with him at the time of event. States prior seizure events, but unsure what happened. Friends denies jerking movements or sphincter loss. No injury.     The history is provided by the patient and a friend.     Review of patient's allergies indicates:  No Known Allergies  No past medical history on file.  No past surgical history on file.  No family history on file.  Social History     Tobacco Use    Smoking status: Every Day     Current packs/day: 0.00     Types: Vaping with nicotine    Smokeless tobacco: Never   Substance Use Topics    Alcohol use: Yes     Review of Systems   Constitutional:  Negative for fever.   HENT:  Negative for sore throat.    Respiratory:  Negative for shortness of breath.    Cardiovascular:  Negative for chest pain.   Gastrointestinal:  Negative for nausea.   Genitourinary:  Negative for dysuria.   Musculoskeletal:  Negative for back pain.   Skin:  Negative for rash.   Neurological:  Positive for syncope. Negative for weakness.   Hematological:  Does not bruise/bleed easily.   All other systems reviewed and are negative.      Physical Exam     Initial Vitals [08/04/23 2038]   BP Pulse Resp Temp SpO2   139/74 68 20 98.4 °F (36.9 °C) 99 %      MAP       --         Physical Exam    Nursing note and vitals reviewed.  Constitutional: He appears well-developed and well-nourished.   HENT:   Head: Normocephalic and atraumatic.   Eyes: Conjunctivae and EOM are normal. Pupils are equal, round, and reactive to light.   Unidirectional horizontal nystagmus   Neck: Neck supple. No thyromegaly present. No JVD present.   Normal range of motion.  Cardiovascular:  Normal rate, regular rhythm, normal heart sounds and intact distal pulses.            Pulmonary/Chest: Breath sounds normal. No stridor. No respiratory distress.   Abdominal: Abdomen is soft. Bowel sounds are normal. He exhibits no distension. There is no abdominal tenderness. There is no rebound and no guarding.   Musculoskeletal:         General: No edema. Normal range of motion.      Cervical back: Normal range of motion and neck supple.     Neurological: He is alert and oriented to person, place, and time. He has normal strength. No cranial nerve deficit. GCS score is 15. GCS eye subscore is 4. GCS verbal subscore is 5. GCS motor subscore is 6.   Skin: Skin is warm and dry. No rash noted.   Psychiatric: His behavior is normal.         ED Course   Procedures  Labs Reviewed   CBC WITH DIFFERENTIAL - Abnormal; Notable for the following components:       Result Value    WBC 4.31 (*)     RBC 4.64 (*)     Hct 41.9 (*)     All other components within normal limits   TSH - Normal   CBC W/ AUTO DIFFERENTIAL    Narrative:     The following orders were created for panel order CBC auto differential.  Procedure                               Abnormality         Status                     ---------                               -----------         ------                     CBC with Differential[[id number]]        Abnormal            Final result                 Please view results for these tests on the individual orders.   COMPREHENSIVE METABOLIC PANEL   EXTRA TUBES    Narrative:     The following orders were created for panel order EXTRA TUBES.  Procedure                               Abnormality         Status                     ---------                               -----------         ------                     Light Blue Top Hold[[id number]]                              In process                 Gold Top Hold[[id number]]                                    In process                   Please view results for these tests on the individual orders.   LIGHT BLUE TOP HOLD   GOLD TOP HOLD          Imaging  Results    None          Medications - No data to display  Medical Decision Making:   History:   Old Medical Records: I decided to obtain old medical records.  Old Records Summarized: records from clinic visits and records from previous admission(s).       <> Summary of Records: EXAMINATION:  CT HEAD WITHOUT CONTRAST     CLINICAL HISTORY:  Mental status change, unknown cause;     TECHNIQUE:  Low dose axial images were obtained through the head.  Coronal and sagittal reformations were also performed. Contrast was not administered.     Automatic exposure control (AEC) was utilized for dose reduction.     Dose: 1073 mGycm     COMPARISON:  None.     FINDINGS:  Ventricles of normal size and shape there is no shift of the midline noted.  There are no extra-axial fluid collections or areas consistent with hemorrhage noted.  No masses is seen no acute infarcts are noted.  The calvarium appears intact.     Impression:     No acute abnormalities are seen        Electronically signed by: Ten Barrientos MD  Date:                                            10/03/2022  Time:                                           21:20  Differential Diagnosis:   Febrile seizure, epilepsy, brain mass, intoxication, medication side effect, stroke, dysrhythmia, among others   Clinical Tests:   Lab Tests: Ordered                          Clinical Impression:   Final diagnoses:  [R53.83] Fatigue, unspecified type (Primary)  [F12.10] Marijuana abuse        ED Disposition Condition    Discharge Stable          ED Prescriptions    None       Follow-up Information       Follow up With Specialties Details Why Contact Info    Ran Felix MD Family Medicine In 1 month  [address] [facility] [address]  [phone]      [facility] - Emergency Dept Emergency Medicine  If symptoms worsen [address] [facility] [id number] [phone]             Kapil Arias MD  08/04/23 7915

## 2023-08-08 ENCOUNTER — HOSPITAL ENCOUNTER (EMERGENCY)
Facility: HOSPITAL | Age: 23
Discharge: PSYCHIATRIC OR SUBSTANCE USE FACILITY | End: 2023-08-08
Attending: EMERGENCY MEDICINE
Payer: MEDICAID

## 2023-08-08 VITALS
RESPIRATION RATE: 16 BRPM | SYSTOLIC BLOOD PRESSURE: 145 MMHG | DIASTOLIC BLOOD PRESSURE: 81 MMHG | HEART RATE: 61 BPM | HEIGHT: 77 IN | OXYGEN SATURATION: 97 % | WEIGHT: 172 LBS | TEMPERATURE: 99 F | BODY MASS INDEX: 20.31 KG/M2

## 2023-08-08 DIAGNOSIS — F32.2 SEVERE MAJOR DEPRESSION: ICD-10-CM

## 2023-08-08 DIAGNOSIS — R45.851 SUICIDAL IDEATION: ICD-10-CM

## 2023-08-08 DIAGNOSIS — F10.920 ACUTE ALCOHOLIC INTOXICATION WITHOUT COMPLICATION: ICD-10-CM

## 2023-08-08 DIAGNOSIS — F41.0 GENERALIZED ANXIETY DISORDER WITH PANIC ATTACKS: Primary | ICD-10-CM

## 2023-08-08 DIAGNOSIS — F41.1 GENERALIZED ANXIETY DISORDER WITH PANIC ATTACKS: Primary | ICD-10-CM

## 2023-08-08 LAB
ALBUMIN SERPL-MCNC: 4.6 G/DL (ref 3.5–5)
ALBUMIN/GLOB SERPL: 1.3 RATIO (ref 1.1–2)
ALP SERPL-CCNC: 84 UNIT/L (ref 40–150)
ALT SERPL-CCNC: 17 UNIT/L (ref 0–55)
AMPHET UR QL SCN: NEGATIVE
APAP SERPL-MCNC: <17.4 UG/ML (ref 17.4–30)
APPEARANCE UR: CLEAR
AST SERPL-CCNC: 30 UNIT/L (ref 5–34)
BACTERIA #/AREA URNS AUTO: NORMAL /HPF
BARBITURATE SCN PRESENT UR: NEGATIVE
BASOPHILS # BLD AUTO: 0.06 X10(3)/MCL
BASOPHILS NFR BLD AUTO: 1.5 %
BENZODIAZ UR QL SCN: NEGATIVE
BILIRUB UR QL STRIP.AUTO: NEGATIVE
BILIRUBIN DIRECT+TOT PNL SERPL-MCNC: 1.1 MG/DL
BUN SERPL-MCNC: 9.8 MG/DL (ref 8.9–20.6)
CALCIUM SERPL-MCNC: 10.3 MG/DL (ref 8.4–10.2)
CANNABINOIDS UR QL SCN: POSITIVE
CHLORIDE SERPL-SCNC: 109 MMOL/L (ref 98–107)
CO2 SERPL-SCNC: 20 MMOL/L (ref 22–29)
COCAINE UR QL SCN: NEGATIVE
COLOR UR: YELLOW
CREAT SERPL-MCNC: 1.03 MG/DL (ref 0.73–1.18)
EOSINOPHIL # BLD AUTO: 0.03 X10(3)/MCL (ref 0–0.9)
EOSINOPHIL NFR BLD AUTO: 0.8 %
ERYTHROCYTE [DISTWIDTH] IN BLOOD BY AUTOMATED COUNT: 13.9 % (ref 11.5–17)
ETHANOL SERPL-MCNC: 166 MG/DL
FENTANYL UR QL SCN: NEGATIVE
FLUAV AG UPPER RESP QL IA.RAPID: NOT DETECTED
FLUBV AG UPPER RESP QL IA.RAPID: NOT DETECTED
GFR SERPLBLD CREATININE-BSD FMLA CKD-EPI: >60 MLS/MIN/1.73/M2
GLOBULIN SER-MCNC: 3.5 GM/DL (ref 2.4–3.5)
GLUCOSE SERPL-MCNC: 79 MG/DL (ref 74–100)
GLUCOSE UR QL STRIP.AUTO: NEGATIVE
HCT VFR BLD AUTO: 41.9 % (ref 42–52)
HGB BLD-MCNC: 15.3 G/DL (ref 14–18)
IMM GRANULOCYTES # BLD AUTO: 0.01 X10(3)/MCL (ref 0–0.04)
IMM GRANULOCYTES NFR BLD AUTO: 0.3 %
KETONES UR QL STRIP.AUTO: NEGATIVE
LEUKOCYTE ESTERASE UR QL STRIP.AUTO: NEGATIVE
LYMPHOCYTES # BLD AUTO: 1.41 X10(3)/MCL (ref 0.6–4.6)
LYMPHOCYTES NFR BLD AUTO: 35.3 %
MCH RBC QN AUTO: 31.4 PG (ref 27–31)
MCHC RBC AUTO-ENTMCNC: 36.5 G/DL (ref 33–36)
MCV RBC AUTO: 86 FL (ref 80–94)
MDMA UR QL SCN: NEGATIVE
MONOCYTES # BLD AUTO: 0.24 X10(3)/MCL (ref 0.1–1.3)
MONOCYTES NFR BLD AUTO: 6 %
NEUTROPHILS # BLD AUTO: 2.24 X10(3)/MCL (ref 2.1–9.2)
NEUTROPHILS NFR BLD AUTO: 56.1 %
NITRITE UR QL STRIP.AUTO: NEGATIVE
NRBC BLD AUTO-RTO: 0 %
OPIATES UR QL SCN: NEGATIVE
PCP UR QL: NEGATIVE
PH UR STRIP.AUTO: 8 [PH]
PH UR: 8 [PH] (ref 3–11)
PLATELET # BLD AUTO: 233 X10(3)/MCL (ref 130–400)
PMV BLD AUTO: 10.1 FL (ref 7.4–10.4)
POTASSIUM SERPL-SCNC: 3.8 MMOL/L (ref 3.5–5.1)
PROT SERPL-MCNC: 8.1 GM/DL (ref 6.4–8.3)
PROT UR QL STRIP.AUTO: NEGATIVE
RBC # BLD AUTO: 4.87 X10(6)/MCL (ref 4.7–6.1)
RBC #/AREA URNS AUTO: <5 /HPF
RBC UR QL AUTO: ABNORMAL
SARS-COV-2 RNA RESP QL NAA+PROBE: NOT DETECTED
SODIUM SERPL-SCNC: 144 MMOL/L (ref 136–145)
SP GR UR STRIP.AUTO: 1.01 (ref 1–1.03)
SPECIFIC GRAVITY, URINE AUTO (.000) (OHS): 1.01 (ref 1–1.03)
SQUAMOUS #/AREA URNS AUTO: <5 /HPF
TSH SERPL-ACNC: 0.7 UIU/ML (ref 0.35–4.94)
UROBILINOGEN UR STRIP-ACNC: 0.2
WBC # SPEC AUTO: 3.99 X10(3)/MCL (ref 4.5–11.5)
WBC #/AREA URNS AUTO: <5 /HPF

## 2023-08-08 PROCEDURE — 93005 ELECTROCARDIOGRAM TRACING: CPT

## 2023-08-08 PROCEDURE — 93010 EKG 12-LEAD: ICD-10-PCS | Mod: ,,, | Performed by: STUDENT IN AN ORGANIZED HEALTH CARE EDUCATION/TRAINING PROGRAM

## 2023-08-08 PROCEDURE — 81001 URINALYSIS AUTO W/SCOPE: CPT | Performed by: EMERGENCY MEDICINE

## 2023-08-08 PROCEDURE — 93010 ELECTROCARDIOGRAM REPORT: CPT | Mod: ,,, | Performed by: STUDENT IN AN ORGANIZED HEALTH CARE EDUCATION/TRAINING PROGRAM

## 2023-08-08 PROCEDURE — 84443 ASSAY THYROID STIM HORMONE: CPT | Performed by: EMERGENCY MEDICINE

## 2023-08-08 PROCEDURE — 99285 EMERGENCY DEPT VISIT HI MDM: CPT

## 2023-08-08 PROCEDURE — 80307 DRUG TEST PRSMV CHEM ANLYZR: CPT | Performed by: EMERGENCY MEDICINE

## 2023-08-08 PROCEDURE — 80053 COMPREHEN METABOLIC PANEL: CPT | Performed by: EMERGENCY MEDICINE

## 2023-08-08 PROCEDURE — 85025 COMPLETE CBC W/AUTO DIFF WBC: CPT | Performed by: EMERGENCY MEDICINE

## 2023-08-08 PROCEDURE — 80143 DRUG ASSAY ACETAMINOPHEN: CPT | Performed by: EMERGENCY MEDICINE

## 2023-08-08 PROCEDURE — 82077 ASSAY SPEC XCP UR&BREATH IA: CPT | Performed by: EMERGENCY MEDICINE

## 2023-08-08 PROCEDURE — 0240U COVID/FLU A&B PCR: CPT | Performed by: EMERGENCY MEDICINE

## 2023-08-08 RX ORDER — LORAZEPAM 1 MG/1
2 TABLET ORAL EVERY 8 HOURS PRN
Status: DISCONTINUED | OUTPATIENT
Start: 2023-08-08 | End: 2023-08-08 | Stop reason: HOSPADM

## 2023-08-08 RX ORDER — DIPHENHYDRAMINE HYDROCHLORIDE 50 MG/ML
50 INJECTION INTRAMUSCULAR; INTRAVENOUS EVERY 4 HOURS PRN
Status: DISCONTINUED | OUTPATIENT
Start: 2023-08-08 | End: 2023-08-08 | Stop reason: HOSPADM

## 2023-08-08 RX ORDER — MAG HYDROX/ALUMINUM HYD/SIMETH 200-200-20
30 SUSPENSION, ORAL (FINAL DOSE FORM) ORAL EVERY 6 HOURS PRN
Status: DISCONTINUED | OUTPATIENT
Start: 2023-08-08 | End: 2023-08-08 | Stop reason: HOSPADM

## 2023-08-08 RX ORDER — IBUPROFEN 200 MG
1 TABLET ORAL DAILY
Status: DISCONTINUED | OUTPATIENT
Start: 2023-08-08 | End: 2023-08-08 | Stop reason: HOSPADM

## 2023-08-08 RX ORDER — ZOLPIDEM TARTRATE 5 MG/1
10 TABLET ORAL NIGHTLY PRN
Status: DISCONTINUED | OUTPATIENT
Start: 2023-08-08 | End: 2023-08-08 | Stop reason: HOSPADM

## 2023-08-08 RX ORDER — LORAZEPAM 2 MG/ML
2 INJECTION INTRAMUSCULAR EVERY 4 HOURS PRN
Status: DISCONTINUED | OUTPATIENT
Start: 2023-08-08 | End: 2023-08-08 | Stop reason: HOSPADM

## 2023-08-08 RX ORDER — ACETAMINOPHEN 325 MG/1
650 TABLET ORAL EVERY 6 HOURS PRN
Status: DISCONTINUED | OUTPATIENT
Start: 2023-08-08 | End: 2023-08-08 | Stop reason: HOSPADM

## 2023-08-08 RX ORDER — ZIPRASIDONE MESYLATE 20 MG/ML
20 INJECTION, POWDER, LYOPHILIZED, FOR SOLUTION INTRAMUSCULAR EVERY 8 HOURS PRN
Status: DISCONTINUED | OUTPATIENT
Start: 2023-08-08 | End: 2023-08-08 | Stop reason: HOSPADM

## 2023-08-08 NOTE — Clinical Note
"Luis Humphreysjaguar Salcido was seen and treated in our emergency department on 8/8/2023.  He may return to work on 08/08/2023.       If you have any questions or concerns, please don't hesitate to call.      Isabelle Foster MD"

## 2023-08-08 NOTE — Clinical Note
"Luis Humphreysjaguar Salcido was seen and treated in our emergency department on 8/8/2023.  He may return to work on 08/08/2023.       If you have any questions or concerns, please don't hesitate to call.      Kellen Briggs, Patient Care Assistant"

## 2023-08-08 NOTE — ED PROVIDER NOTES
Encounter Date: 8/8/2023       History     Chief Complaint   Patient presents with    Panic Attack     Anxiety/panic attack while at work x 30 mins. Hx of anxiety. Not on any meds.      23-year-old male with a history of asthma presents ED for evaluation of a panic attack.  He was at work when he was feeling extremely anxious and went to the bathroom and began hyperventilating and lost consciousness.  He said it felt similar to previous panic attacks and has been seen in the ED in the past for the same.  He was not seen a physician or been placed on any medication.  He also endorses general anxiety, poor sleep all worsened by being placed on night shift about 1 month ago because he was unable to sleep during the day.  He endorses severe depression with suicide ideation without specific plan.    The history is provided by the patient and the EMS personnel. No  was used.   Mental Health Problem  The primary symptoms include depressed mood, hallucinations and suicidal ideas. The current episode started several weeks ago. This is a new problem.   The onset of the illness is precipitated by emotional stress. The degree of incapacity that he is experiencing as a consequence of his illness is severe. Sequelae of the illness include an inability to work. Additional symptoms of the illness include insomnia, appetite change and attention impairment. Additional symptoms of the illness do not include fatigue or abdominal pain. He admits to suicidal ideas. He does not have a plan to attempt suicide. He contemplates harming himself. He has not already injured self. He does not contemplate injuring another person. He has not already  injured another person. Risk factors that are present for mental illness include a family history of mental illness.     Review of patient's allergies indicates:  No Known Allergies  History reviewed. No pertinent past medical history.  No past surgical history on file.  No family  history on file.  Social History     Tobacco Use    Smoking status: Every Day     Current packs/day: 0.00     Types: Vaping with nicotine    Smokeless tobacco: Never   Substance Use Topics    Alcohol use: Yes    Drug use: Yes     Types: Marijuana     Review of Systems   Constitutional:  Positive for appetite change. Negative for activity change, diaphoresis, fatigue and fever.   HENT:  Negative for congestion, postnasal drip, rhinorrhea, sinus pain, sneezing and sore throat.    Respiratory:  Negative for cough, chest tightness, shortness of breath and wheezing.    Cardiovascular:  Negative for chest pain, palpitations and leg swelling.   Gastrointestinal:  Negative for abdominal distention, abdominal pain and blood in stool.   Genitourinary:  Negative for decreased urine volume, difficulty urinating and dysuria.   Musculoskeletal: Negative.    Skin:  Negative for color change and pallor.   Neurological:  Negative for dizziness, speech difficulty, weakness, light-headedness and numbness.   Psychiatric/Behavioral:  Positive for hallucinations, sleep disturbance and suicidal ideas. The patient is nervous/anxious and has insomnia.    All other systems reviewed and are negative.      Physical Exam     Initial Vitals [08/08/23 0026]   BP Pulse Resp Temp SpO2   139/79 80 (!) 22 97.5 °F (36.4 °C) 98 %      MAP       --         Physical Exam    Nursing note and vitals reviewed.  Constitutional: He appears well-developed and well-nourished. He is not diaphoretic. No distress.   HENT:   Head: Normocephalic and atraumatic.   Nose: Nose normal.   Mouth/Throat: Oropharynx is clear and moist.   Eyes: Conjunctivae and EOM are normal. Pupils are equal, round, and reactive to light.   Neck: Trachea normal. Neck supple.   Normal range of motion.  Cardiovascular:  Normal rate, regular rhythm, normal heart sounds and intact distal pulses.     Exam reveals no gallop and no friction rub.       No murmur heard.  Pulmonary/Chest: Breath  sounds normal. No respiratory distress. He has no wheezes. He has no rhonchi. He has no rales. He exhibits no tenderness.   Abdominal: Abdomen is soft. Bowel sounds are normal. He exhibits no distension and no mass. There is no abdominal tenderness. There is no rebound.   Musculoskeletal:         General: No tenderness or edema. Normal range of motion.      Cervical back: Normal range of motion and neck supple.      Lumbar back: Normal. No tenderness. Normal range of motion.     Neurological: He is alert and oriented to person, place, and time. He has normal strength. No cranial nerve deficit or sensory deficit.   Skin: Skin is warm and dry. Capillary refill takes less than 2 seconds. No rash and no abscess noted. No erythema. No pallor.   Psychiatric: His speech is normal. Judgment normal. His mood appears anxious. He is withdrawn. Cognition and memory are normal. He exhibits a depressed mood. He expresses suicidal ideation.   Poor eye contact         ED Course   Procedures  Labs Reviewed   COMPREHENSIVE METABOLIC PANEL - Abnormal; Notable for the following components:       Result Value    Chloride 109 (*)     Carbon Dioxide 20 (*)     Calcium Level Total 10.3 (*)     All other components within normal limits   URINALYSIS, REFLEX TO URINE CULTURE - Abnormal; Notable for the following components:    Blood, UA Trace (*)     All other components within normal limits   DRUG SCREEN, URINE (BEAKER) - Abnormal; Notable for the following components:    Cannabinoids, Urine Positive (*)     All other components within normal limits    Narrative:     Cut off concentrations:    Amphetamines - 1000 ng/ml  Barbiturates - 200 ng/ml  Benzodiazepine - 200 ng/ml  Cannabinoids (THC) - 50 ng/ml  Cocaine - 300 ng/ml  Fentanyl - 1.0 ng/ml  MDMA - 500 ng/ml  Opiates - 300 ng/ml   Phencyclidine (PCP) - 25 ng/ml    Specimen submitted for drug analysis and tested for pH and specific gravity in order to evaluate sample integrity. Suspect  tampering if specific gravity is <1.003 and/or pH is not within the range of 4.5 - 8.0  False negatives may result form substances such as bleach added to urine.  False positives may result for the presence of a substance with similar chemical structure to the drug or its metabolite.    This test provides only a PRELIMINARY analytical test result. A more specific alternate chemical method must be used in order to obtain a confirmed analytical result. Gas chromatography/mass spectrometry (GC/MS) is the preferred confirmatory method. Other chemical confirmation methods are available. Clinical consideration and professional judgement should be applied to any drug of abuse test result, particularly when preliminary positive results are used.    Positive results will be confirmed only at the physicians request. Unconfirmed screening results are to be used only for medical purposes (treatment).        ALCOHOL,MEDICAL (ETHANOL) - Abnormal; Notable for the following components:    Ethanol Level 166.0 (*)     All other components within normal limits   ACETAMINOPHEN LEVEL - Abnormal; Notable for the following components:    Acetaminophen Level <17.4 (*)     All other components within normal limits   CBC WITH DIFFERENTIAL - Abnormal; Notable for the following components:    WBC 3.99 (*)     Hct 41.9 (*)     MCH 31.4 (*)     MCHC 36.5 (*)     All other components within normal limits   URINALYSIS, MICROSCOPIC - Normal   CBC W/ AUTO DIFFERENTIAL    Narrative:     The following orders were created for panel order CBC auto differential.  Procedure                               Abnormality         Status                     ---------                               -----------         ------                     CBC with Differential[[id number]]        Abnormal            Final result                 Please view results for these tests on the individual orders.   TSH   COVID/FLU A&B PCR     EKG Readings: (Independently Interpreted)    Rhythm: Normal Sinus Rhythm. Heart Rate: 74. Ectopy: No Ectopy. Conduction: Normal. ST Segments: Normal ST Segments. T Waves: Normal. Axis: Normal.   0035       Imaging Results    None     Medical Decision Making  Given patient's presentation, differential diagnosis includes but is not limited to panic attack, anxiety, drug use, ethanol use, electrolyte dernagement, depression  To evaluate these  possible etiologies cbc, cmp, acetaminophen, salicylate, ethanol, ua, uds, tsh, ekg were ordered and reviewed  Etoh elevated, pt endorsed etoh use. Ekg without abnormality, remainder of labs unremarkable, mild leukopenia and very mild metabolic acidosis consistent with etoh intoxication do not require treatment  Pec in place  I think arrhythmia is unlikely. EKG shows normal sinus rhythm with no interval abnormalities such as QT prolongation or WPW. There are no findings to suggest Brugada syndrome. Cardiac monitoring in the emergency department reveals no tachycardic or bradycardic dysrhythmia. Hypertrophic cardiomyopathy was considered, but there are no clear historical elements pointing towards this. EKG is not suggestive. The QRS voltage is not extremely large and there are no suggestive Q waves      Problems Addressed:  Acute alcoholic intoxication without complication: acute illness or injury  Generalized anxiety disorder with panic attacks: chronic illness or injury with severe exacerbation, progression, or side effects of treatment  Severe major depression: acute illness or injury that poses a threat to life or bodily functions  Suicidal ideation: acute illness or injury that poses a threat to life or bodily functions    Amount and/or Complexity of Data Reviewed  Independent Historian: EMS  External Data Reviewed: labs.  Labs: ordered.  ECG/medicine tests: ordered and independent interpretation performed.    Risk  OTC drugs.  Prescription drug management.           Medications   ziprasidone injection 20 mg (has no  administration in time range)   LORazepam tablet 2 mg (has no administration in time range)   acetaminophen tablet 650 mg (has no administration in time range)   aluminum-magnesium hydroxide-simethicone 200-200-20 mg/5 mL suspension 30 mL (has no administration in time range)   zolpidem tablet 10 mg (has no administration in time range)   nicotine 21 mg/24 hr 1 patch (has no administration in time range)   LORazepam injection 2 mg (has no administration in time range)   diphenhydrAMINE injection 50 mg (has no administration in time range)     Medical Decision Making:   History:   Old Medical Records: I decided to obtain old medical records.  Old Records Summarized: records from previous admission(s).       <> Summary of Records: Previous visits for syncope  Initial Assessment:   See h pi  Independently Interpreted Test(s):   I have ordered and independently interpreted EKG Reading(s) - see prior notes  Clinical Tests:   Lab Tests: Ordered and Reviewed  Radiological Study: Ordered and Reviewed  Medical Tests: Reviewed and Ordered    Additional MDM:   Psych: A Physician Emergency Certificate (PEC) was done in the ED for: Suicidal. The patient has been medically cleared. Outcome: The patient was approved for transfer to another facility.           ED Course as of 08/08/23 0153   Tue Aug 08, 2023   0107 Cardiac monitoring ordered and reviewed rate of 70 normal sinus rhythm [BS]   0141 Resting more comfortably, updated pt and mother after pt consented [BS]      ED Course User Index  [BS] Isabelle Foster MD       Medically cleared for psychiatry placement: 8/8/2023  1:52 AM         Clinical Impression:   Final diagnoses:  [F41.1, F41.0] Generalized anxiety disorder with panic attacks (Primary)  [F32.2] Severe major depression  [R45.851] Suicidal ideation  [F10.920] Acute alcoholic intoxication without complication        ED Disposition Condition    Transfer to Psych Facility Stable          ED Prescriptions    None        Follow-up Information    None          Isabelle Foster MD  08/08/23 0150

## 2023-12-27 ENCOUNTER — HOSPITAL ENCOUNTER (EMERGENCY)
Facility: HOSPITAL | Age: 23
Discharge: HOME | End: 2023-12-27
Attending: EMERGENCY MEDICINE
Payer: MEDICAID

## 2023-12-27 VITALS
TEMPERATURE: 99 F | BODY MASS INDEX: 19.25 KG/M2 | RESPIRATION RATE: 18 BRPM | OXYGEN SATURATION: 100 % | WEIGHT: 163 LBS | HEIGHT: 77 IN | HEART RATE: 56 BPM | DIASTOLIC BLOOD PRESSURE: 96 MMHG | SYSTOLIC BLOOD PRESSURE: 140 MMHG

## 2023-12-27 DIAGNOSIS — S61.217A LACERATION OF LEFT LITTLE FINGER WITHOUT FOREIGN BODY WITHOUT DAMAGE TO NAIL, INITIAL ENCOUNTER: Primary | ICD-10-CM

## 2023-12-27 PROCEDURE — 99284 EMERGENCY DEPT VISIT MOD MDM: CPT

## 2023-12-27 PROCEDURE — 12001 RPR S/N/AX/GEN/TRNK 2.5CM/<: CPT

## 2023-12-27 PROCEDURE — 90471 IMMUNIZATION ADMIN: CPT | Performed by: EMERGENCY MEDICINE

## 2023-12-27 PROCEDURE — 90715 TDAP VACCINE 7 YRS/> IM: CPT | Performed by: EMERGENCY MEDICINE

## 2023-12-27 PROCEDURE — 63600175 PHARM REV CODE 636 W HCPCS: Performed by: EMERGENCY MEDICINE

## 2023-12-27 RX ORDER — LIDOCAINE HYDROCHLORIDE 10 MG/ML
100 INJECTION INFILTRATION; PERINEURAL
Status: DISCONTINUED | OUTPATIENT
Start: 2023-12-27 | End: 2023-12-27 | Stop reason: HOSPADM

## 2023-12-27 RX ORDER — MUPIROCIN 20 MG/G
OINTMENT TOPICAL 3 TIMES DAILY
Qty: 1 G | Refills: 0 | Status: SHIPPED | OUTPATIENT
Start: 2023-12-27 | End: 2024-01-01

## 2023-12-27 RX ADMIN — TETANUS TOXOID, REDUCED DIPHTHERIA TOXOID AND ACELLULAR PERTUSSIS VACCINE, ADSORBED 0.5 ML: 5; 2.5; 8; 8; 2.5 SUSPENSION INTRAMUSCULAR at 10:12

## 2023-12-27 NOTE — ED PROVIDER NOTES
Encounter Date: 12/27/2023       History     Chief Complaint   Patient presents with    Laceration     Pt presents with laceratiion to left 5th finger from a knife while opening up a bag 12/26/23 at 2200. Pt relates that he is not able to bend finger and swelling to knuckle     Patient is a 22 yo M presenting with laceration to the dorsal aspect of his left hand. Occurred just prior to arrival. He was using a knife and it slipped. No numbness but feels like he cannot straighten the finger. He is uncertain of his last tetanus. He has otherwise been at his baseline health.         Review of patient's allergies indicates:  No Known Allergies  No past medical history on file.  No past surgical history on file.  No family history on file.  Social History     Tobacco Use    Smoking status: Every Day     Types: Vaping with nicotine    Smokeless tobacco: Never   Substance Use Topics    Alcohol use: Yes    Drug use: Yes     Types: Marijuana     Review of Systems   Constitutional:  Negative for fever.   HENT:  Negative for sore throat.    Respiratory:  Negative for shortness of breath.    Cardiovascular:  Negative for chest pain.   Gastrointestinal:  Negative for nausea.   Genitourinary:  Negative for dysuria.   Musculoskeletal:  Negative for back pain.   Skin:  Positive for wound. Negative for rash.   Neurological:  Negative for weakness.   Hematological:  Does not bruise/bleed easily.       Physical Exam     Initial Vitals [12/27/23 0901]   BP Pulse Resp Temp SpO2   (!) 140/96 (!) 56 18 98.6 °F (37 °C) 100 %      MAP       --         Physical Exam    Nursing note and vitals reviewed.  Constitutional: He appears well-developed and well-nourished. He is not diaphoretic. No distress.   HENT:   Head: Normocephalic and atraumatic.   Neck: Neck supple.   Cardiovascular:  Normal rate, regular rhythm and normal heart sounds.           Pulmonary/Chest: Breath sounds normal. No respiratory distress. He has no wheezes. He has no  jasonhi.   Musculoskeletal:      Cervical back: Neck supple.      Comments: L hand: 1.3 cm laceration over the dorsal aspect of the PIP of the 5th digit. Appears superficial, through dermis. Sensation intact. Cap refill < 2 sec. Finger is flexed at the PIP. He does not straighten it but with passive stretch it can be placed in extension and held with the patient now saying he cannot flex it. There  is no laceration to the palmar aspect of the digit that would affect the flexor tendons.     Neurological: He is alert and oriented to person, place, and time.   Skin: Skin is warm and dry.   Psychiatric: He has a normal mood and affect. Thought content normal.         ED Course   Lac Repair    Date/Time: 12/27/2023 10:43 AM    Performed by: Suyapa Langford MD  Authorized by: Suyapa Langford MD    Consent:     Consent obtained:  Verbal    Consent given by:  Patient    Risks, benefits, and alternatives were discussed: yes      Risks discussed:  Infection, pain and tendon damage    Alternatives discussed:  No treatment and delayed treatment  Universal protocol:     Patient identity confirmed:  Verbally with patient  Anesthesia:     Anesthesia method:  Local infiltration (3)    Local anesthetic:  Lidocaine 1% w/o epi  Laceration details:     Location:  Hand    Hand location:  L hand, dorsum    Length (cm):  1.3    Depth (mm):  1  Pre-procedure details:     Preparation:  Patient was prepped and draped in usual sterile fashion  Treatment:     Area cleansed with:  Povidone-iodine    Amount of cleaning:  Standard    Irrigation solution:  Sterile saline    Irrigation volume:  20    Irrigation method:  Syringe  Skin repair:     Repair method:  Sutures    Suture size:  2-0 and 5-0    Suture material:  Prolene    Suture technique:  Simple interrupted    Number of sutures:  2  Approximation:     Approximation:  Close  Repair type:     Repair type:  Simple  Post-procedure details:     Dressing:  Splint for protection    Procedure  completion:  Tolerated    Labs Reviewed - No data to display       Imaging Results    None          Medications   Tdap (BOOSTRIX) vaccine injection 0.5 mL (0.5 mLs Intramuscular Given 12/27/23 1005)     Medical Decision Making  No evidence of tendon involvement on examination. The patient will not flex or extend it based on which position it was left in rest. I suspect this is due to pain and not due to tendon injury. However, will do referral to hand surgery incase of tendon involvement not identified on exam.     Problems Addressed:  Laceration of left little finger without foreign body without damage to nail, initial encounter: acute illness or injury    Risk  Prescription drug management.                                      Clinical Impression:  Final diagnoses:  [S61.217A] Laceration of left little finger without foreign body without damage to nail, initial encounter (Primary)          ED Disposition Condition    Discharge Stable          ED Prescriptions       Medication Sig Dispense Start Date End Date Auth. Provider    mupirocin (BACTROBAN) 2 % ointment Apply topically 3 (three) times daily. for 5 days 1 g 12/27/2023 1/1/2024 Suyapa Langford MD          Follow-up Information       Follow up With Specialties Details Why Contact Info    [facility] - Emergency Dept Emergency Medicine In 5 days For wound re-check and suture removal [address] [facility] [id number] [phone]    Weston Bellamy MD Hand Surgery, Orthopedic Surgery Schedule an appointment as soon as possible for a visit   [address] [facility] [address]  [facility] [id number] [phone]      Please follow up with a primary care physician.  Call in 1 day For blood pressure recheck. If symptoms worsen, return to the ED If you do not have a doctor, please call [phone] to schedule your follow up with a local primary care doctor.             Suyapa Langford MD  01/02/24 4638

## 2023-12-27 NOTE — ED TRIAGE NOTES
Pt presents with laceratiion to left 5th finger from a knife while opening up a bag 12/26/23 at 2200. Pt relates that he is not able to bend finger and swelling to knuckle

## 2024-02-24 ENCOUNTER — HOSPITAL ENCOUNTER (EMERGENCY)
Facility: HOSPITAL | Age: 24
Discharge: LEFT BEFORE DISCHARGE | End: 2024-02-24
Attending: EMERGENCY MEDICINE
Payer: MEDICAID

## 2024-02-24 VITALS
BODY MASS INDEX: 21.17 KG/M2 | DIASTOLIC BLOOD PRESSURE: 83 MMHG | WEIGHT: 179.25 LBS | SYSTOLIC BLOOD PRESSURE: 132 MMHG | RESPIRATION RATE: 20 BRPM | TEMPERATURE: 98 F | HEART RATE: 62 BPM | OXYGEN SATURATION: 98 % | HEIGHT: 77 IN

## 2024-02-24 DIAGNOSIS — K85.20 ALCOHOL-INDUCED ACUTE PANCREATITIS, UNSPECIFIED COMPLICATION STATUS: Primary | ICD-10-CM

## 2024-02-24 LAB
ALBUMIN SERPL-MCNC: 4.1 G/DL (ref 3.5–5)
ALBUMIN/GLOB SERPL: 1.1 RATIO (ref 1.1–2)
ALP SERPL-CCNC: 68 UNIT/L (ref 40–150)
ALT SERPL-CCNC: 15 UNIT/L (ref 0–55)
AMPHET UR QL SCN: NEGATIVE
APPEARANCE UR: CLEAR
AST SERPL-CCNC: 28 UNIT/L (ref 5–34)
BACTERIA #/AREA URNS AUTO: ABNORMAL /HPF
BARBITURATE SCN PRESENT UR: NEGATIVE
BASOPHILS # BLD AUTO: 0.04 X10(3)/MCL
BASOPHILS NFR BLD AUTO: 0.5 %
BENZODIAZ UR QL SCN: NEGATIVE
BILIRUB SERPL-MCNC: 0.4 MG/DL
BILIRUB UR QL STRIP.AUTO: NEGATIVE
BUN SERPL-MCNC: 15.7 MG/DL (ref 8.9–20.6)
CALCIUM SERPL-MCNC: 8.7 MG/DL (ref 8.4–10.2)
CANNABINOIDS UR QL SCN: POSITIVE
CHLORIDE SERPL-SCNC: 107 MMOL/L (ref 98–107)
CO2 SERPL-SCNC: 27 MMOL/L (ref 22–29)
COCAINE UR QL SCN: NEGATIVE
COLOR UR AUTO: ABNORMAL
CREAT SERPL-MCNC: 1.18 MG/DL (ref 0.73–1.18)
EOSINOPHIL # BLD AUTO: 0.05 X10(3)/MCL (ref 0–0.9)
EOSINOPHIL NFR BLD AUTO: 0.7 %
ERYTHROCYTE [DISTWIDTH] IN BLOOD BY AUTOMATED COUNT: 14.1 % (ref 11.5–17)
FENTANYL UR QL SCN: NEGATIVE
GFR SERPLBLD CREATININE-BSD FMLA CKD-EPI: >60 MLS/MIN/1.73/M2
GLOBULIN SER-MCNC: 3.6 GM/DL (ref 2.4–3.5)
GLUCOSE SERPL-MCNC: 93 MG/DL (ref 74–100)
GLUCOSE UR QL STRIP.AUTO: NORMAL
HCT VFR BLD AUTO: 42.1 % (ref 42–52)
HGB BLD-MCNC: 14.4 G/DL (ref 14–18)
HOLD SPECIMEN: NORMAL
HYALINE CASTS #/AREA URNS LPF: ABNORMAL /LPF
IMM GRANULOCYTES # BLD AUTO: 0.02 X10(3)/MCL (ref 0–0.04)
IMM GRANULOCYTES NFR BLD AUTO: 0.3 %
KETONES UR QL STRIP.AUTO: ABNORMAL
LEUKOCYTE ESTERASE UR QL STRIP.AUTO: NEGATIVE
LIPASE SERPL-CCNC: 412 U/L
LYMPHOCYTES # BLD AUTO: 1.27 X10(3)/MCL (ref 0.6–4.6)
LYMPHOCYTES NFR BLD AUTO: 17.4 %
MCH RBC QN AUTO: 31.6 PG (ref 27–31)
MCHC RBC AUTO-ENTMCNC: 34.2 G/DL (ref 33–36)
MCV RBC AUTO: 92.3 FL (ref 80–94)
MDMA UR QL SCN: NEGATIVE
MONOCYTES # BLD AUTO: 0.21 X10(3)/MCL (ref 0.1–1.3)
MONOCYTES NFR BLD AUTO: 2.9 %
MUCOUS THREADS URNS QL MICRO: ABNORMAL /LPF
NEUTROPHILS # BLD AUTO: 5.69 X10(3)/MCL (ref 2.1–9.2)
NEUTROPHILS NFR BLD AUTO: 78.2 %
NITRITE UR QL STRIP.AUTO: NEGATIVE
NRBC BLD AUTO-RTO: 0 %
OPIATES UR QL SCN: NEGATIVE
PCP UR QL: NEGATIVE
PH UR STRIP.AUTO: 7 [PH]
PH UR: 7 [PH] (ref 3–11)
PLATELET # BLD AUTO: 221 X10(3)/MCL (ref 130–400)
PMV BLD AUTO: 9.8 FL (ref 7.4–10.4)
POTASSIUM SERPL-SCNC: 4 MMOL/L (ref 3.5–5.1)
PROT SERPL-MCNC: 7.7 GM/DL (ref 6.4–8.3)
PROT UR QL STRIP.AUTO: ABNORMAL
RBC # BLD AUTO: 4.56 X10(6)/MCL (ref 4.7–6.1)
RBC #/AREA URNS AUTO: ABNORMAL /HPF
RBC UR QL AUTO: ABNORMAL
SODIUM SERPL-SCNC: 143 MMOL/L (ref 136–145)
SPECIFIC GRAVITY, URINE AUTO (.000) (OHS): 1.01 (ref 1–1.03)
SQUAMOUS #/AREA URNS LPF: ABNORMAL /HPF
UROBILINOGEN UR STRIP-ACNC: NORMAL
WBC # SPEC AUTO: 7.28 X10(3)/MCL (ref 4.5–11.5)
WBC #/AREA URNS AUTO: ABNORMAL /HPF

## 2024-02-24 PROCEDURE — 80053 COMPREHEN METABOLIC PANEL: CPT | Performed by: NURSE PRACTITIONER

## 2024-02-24 PROCEDURE — 96375 TX/PRO/DX INJ NEW DRUG ADDON: CPT

## 2024-02-24 PROCEDURE — 25500020 PHARM REV CODE 255

## 2024-02-24 PROCEDURE — 96361 HYDRATE IV INFUSION ADD-ON: CPT

## 2024-02-24 PROCEDURE — 96374 THER/PROPH/DIAG INJ IV PUSH: CPT | Mod: 59

## 2024-02-24 PROCEDURE — 25000003 PHARM REV CODE 250: Performed by: NURSE PRACTITIONER

## 2024-02-24 PROCEDURE — 85025 COMPLETE CBC W/AUTO DIFF WBC: CPT | Performed by: NURSE PRACTITIONER

## 2024-02-24 PROCEDURE — 80307 DRUG TEST PRSMV CHEM ANLYZR: CPT | Performed by: NURSE PRACTITIONER

## 2024-02-24 PROCEDURE — 99285 EMERGENCY DEPT VISIT HI MDM: CPT | Mod: 25

## 2024-02-24 PROCEDURE — 81001 URINALYSIS AUTO W/SCOPE: CPT | Performed by: NURSE PRACTITIONER

## 2024-02-24 PROCEDURE — 83690 ASSAY OF LIPASE: CPT | Performed by: NURSE PRACTITIONER

## 2024-02-24 PROCEDURE — 63600175 PHARM REV CODE 636 W HCPCS: Performed by: NURSE PRACTITIONER

## 2024-02-24 RX ORDER — DIPHENHYDRAMINE HYDROCHLORIDE 50 MG/ML
25 INJECTION INTRAMUSCULAR; INTRAVENOUS
Status: COMPLETED | OUTPATIENT
Start: 2024-02-24 | End: 2024-02-24

## 2024-02-24 RX ORDER — KETOROLAC TROMETHAMINE 30 MG/ML
15 INJECTION, SOLUTION INTRAMUSCULAR; INTRAVENOUS
Status: COMPLETED | OUTPATIENT
Start: 2024-02-24 | End: 2024-02-24

## 2024-02-24 RX ORDER — PROCHLORPERAZINE EDISYLATE 5 MG/ML
10 INJECTION INTRAMUSCULAR; INTRAVENOUS
Status: COMPLETED | OUTPATIENT
Start: 2024-02-24 | End: 2024-02-24

## 2024-02-24 RX ADMIN — DIPHENHYDRAMINE HYDROCHLORIDE 25 MG: 50 INJECTION INTRAMUSCULAR; INTRAVENOUS at 04:02

## 2024-02-24 RX ADMIN — PROCHLORPERAZINE EDISYLATE 10 MG: 5 INJECTION INTRAMUSCULAR; INTRAVENOUS at 04:02

## 2024-02-24 RX ADMIN — SODIUM CHLORIDE 1000 ML: 9 INJECTION, SOLUTION INTRAVENOUS at 04:02

## 2024-02-24 RX ADMIN — IOHEXOL 100 ML: 350 INJECTION, SOLUTION INTRAVENOUS at 06:02

## 2024-02-24 RX ADMIN — KETOROLAC TROMETHAMINE 15 MG: 30 INJECTION, SOLUTION INTRAMUSCULAR; INTRAVENOUS at 04:02

## 2024-02-24 NOTE — Clinical Note
"Lusi Dickey" Loly was seen and treated in our emergency department on 2/24/2024.  He may return to work on 02/27/2024.       If you have any questions or concerns, please don't hesitate to call.      CT RN    "

## 2024-02-24 NOTE — Clinical Note
"Luis Dickey" Loly was seen and treated in our emergency department on 2/24/2024.  He may return to work on 02/26/2024.       If you have any questions or concerns, please don't hesitate to call.      CT RN    "

## 2024-02-24 NOTE — ED PROVIDER NOTES
Encounter Date: 2/24/2024      I, Marvin Sevilla MD, did conduct a face to face encounter with this patient initially seen by our advanced practice provider. I did perform a history and physical, did direct the evaluation and management, and do agree with the care as documented.       History     Chief Complaint   Patient presents with    Abdominal Pain    Nausea     Generalized abdominal pain and nausea since this morning     The patient presents with reports abdominal pain and nausea that started this am. The course/duration of symptoms is fluctuating in intensity.  The character of symptoms is crampy.  The degree at onset was minimal.  The Location of pain at onset was diffuse and abdominal.  The degree at present is moderate.  Radiating pain: none. There are exacerbating factors including eating and drinking. Therapy today: none.  Risk factors consist of none.  Associated symptoms: nausea, denies vomiting, denies chest pain, denies diarrhea, denies back pain, denies shortness of breath, denies fever, denies chills, denies headache and denies dizziness.  Normal bm this morning.         Review of patient's allergies indicates:  No Known Allergies  History reviewed. No pertinent past medical history.  History reviewed. No pertinent surgical history.  History reviewed. No pertinent family history.  Social History     Tobacco Use    Smoking status: Every Day     Types: Vaping with nicotine    Smokeless tobacco: Never   Substance Use Topics    Alcohol use: Yes    Drug use: Yes     Types: Marijuana     Review of Systems   Constitutional:  Negative for fever.   HENT:  Negative for sore throat.    Respiratory:  Negative for shortness of breath.    Cardiovascular:  Negative for chest pain.   Gastrointestinal:  Positive for abdominal pain and nausea.   Genitourinary:  Negative for dysuria.   Musculoskeletal:  Negative for back pain.   Skin:  Negative for rash.   Neurological:  Negative for weakness.   Hematological:   Does not bruise/bleed easily.   All other systems reviewed and are negative.      Physical Exam     Initial Vitals [02/24/24 1542]   BP Pulse Resp Temp SpO2   (!) 159/98 67 18 98.2 °F (36.8 °C) 100 %      MAP       --         Physical Exam    Nursing note and vitals reviewed.  Constitutional: He appears well-developed and well-nourished.   HENT:   Head: Normocephalic and atraumatic.   Neck: Neck supple.   Normal range of motion.  Cardiovascular:  Normal rate, regular rhythm, normal heart sounds and intact distal pulses.           Pulmonary/Chest: Effort normal and breath sounds normal. He has no decreased breath sounds.   Abdominal: Abdomen is soft and flat. Bowel sounds are normal. There is no abdominal tenderness.   Abdomen soft nontender, no peritoneal signs   Musculoskeletal:         General: Normal range of motion.      Cervical back: Normal range of motion and neck supple.     Neurological: He is alert and oriented to person, place, and time. He has normal strength.   Skin: Skin is warm and dry.   Psychiatric: He has a normal mood and affect.         ED Course   Procedures  Labs Reviewed   COMPREHENSIVE METABOLIC PANEL - Abnormal; Notable for the following components:       Result Value    Globulin 3.6 (*)     All other components within normal limits   LIPASE - Abnormal; Notable for the following components:    Lipase Level 412 (*)     All other components within normal limits   URINALYSIS, REFLEX TO URINE CULTURE - Abnormal; Notable for the following components:    Protein, UA 1+ (*)     Ketones, UA Trace (*)     Blood, UA Trace (*)     Mucous, UA Trace (*)     RBC, UA 6-10 (*)     All other components within normal limits   DRUG SCREEN, URINE (BEAKER) - Abnormal; Notable for the following components:    Cannabinoids, Urine Positive (*)     All other components within normal limits    Narrative:     Cut off concentrations:    Amphetamines - 1000 ng/ml  Barbiturates - 200 ng/ml  Benzodiazepine - 200  ng/ml  Cannabinoids (THC) - 50 ng/ml  Cocaine - 300 ng/ml  Fentanyl - 1.0 ng/ml  MDMA - 500 ng/ml  Opiates - 300 ng/ml   Phencyclidine (PCP) - 25 ng/ml    Specimen submitted for drug analysis and tested for pH and specific gravity in order to evaluate sample integrity. Suspect tampering if specific gravity is <1.003 and/or pH is not within the range of 4.5 - 8.0  False negatives may result form substances such as bleach added to urine.  False positives may result for the presence of a substance with similar chemical structure to the drug or its metabolite.    This test provides only a PRELIMINARY analytical test result. A more specific alternate chemical method must be used in order to obtain a confirmed analytical result. Gas chromatography/mass spectrometry (GC/MS) is the preferred confirmatory method. Other chemical confirmation methods are available. Clinical consideration and professional judgement should be applied to any drug of abuse test result, particularly when preliminary positive results are used.    Positive results will be confirmed only at the physicians request. Unconfirmed screening results are to be used only for medical purposes (treatment).        CBC WITH DIFFERENTIAL - Abnormal; Notable for the following components:    RBC 4.56 (*)     MCH 31.6 (*)     All other components within normal limits   CBC W/ AUTO DIFFERENTIAL    Narrative:     The following orders were created for panel order CBC auto differential.  Procedure                               Abnormality         Status                     ---------                               -----------         ------                     CBC with Differential[[id number]]       Abnormal            Final result                 Please view results for these tests on the individual orders.   EXTRA TUBES    Narrative:     The following orders were created for panel order EXTRA TUBES.  Procedure                               Abnormality         Status                      ---------                               -----------         ------                     Light Blue Top Hold[[id number]]                             Final result               Gold Top Hold[[id number]]                                   Final result               Pink Top Hold[[id number]]                                   Final result                 Please view results for these tests on the individual orders.   LIGHT BLUE TOP HOLD   GOLD TOP HOLD   PINK TOP HOLD          Imaging Results              CT Abdomen Pelvis With IV Contrast NO Oral Contrast (Preliminary result)  Result time 02/24/24 20:06:11      Preliminary result by Wilson Michel MD (02/24/24 20:06:11)                   Narrative:    START OF REPORT:  Technique: CT of the abdomen and pelvis was performed with axial images as well as sagittal and coronal reconstruction images with intravenous contrast.    Comparison: None available.    Clinical History: Abdominal pain.    Dosage Information: Automated Exposure Control was utilized 305.49 mGy.cm.    Findings:  Lines and Tubes: None.  Thorax:  Lungs: The visualized lung bases appear unremarkable.  Pleura: No effusions or thickening.  Heart: The heart size is within normal limits.  Abdomen:  Abdominal Wall: No abdominal wall pathology is seen.  Liver: The liver appears unremarkable.  Biliary System: No intrahepatic or extrahepatic biliary duct dilatation is seen.  Gallbladder: The gallbladder appears unremarkable.  Pancreas: The pancreas is edematous with surrounding pronounced peripancreatic fat stranding and fluid in the left anterior pararenal space. This is consistent with acute pancreatitis. No pancreatic pseudocyst is identified.  Spleen: The spleen appears unremarkable.  Adrenals: The adrenal glands appear unremarkable.  Kidneys: The kidneys appear unremarkable with no stones cysts masses or hydronephrosis.  Aorta: The abdominal aorta appears unremarkable.  IVC:  Unremarkable.  Bowel:  Esophagus: The visualized esophagus appears unremarkable.  Stomach: The stomach appears unremarkable.  Duodenum: Unremarkable appearing duodenum.  Small Bowel: The small bowel appears unremarkable.  Colon: Nondistended.  Appendix: The appendix appears unremarkable and is partially seen on Series 2 Image 107.    Pelvis:  Bladder: The bladder appears unremarkable.  Male:  Prostate gland: The prostate gland appears unremarkable.    Bony structures:  Dorsal Spine: The visualized dorsal spine appears unremarkable.  Bony Pelvis: The visualized bony structures of the pelvis appear unremarkable.      Impression:  1. The pancreas is edematous with surrounding pronounced peripancreatic fat stranding and fluid in the left anterior pararenal space. This is consistent with acute pancreatitis. No pancreatic pseudocyst is identified. Correlate with clinical and laboratory findings as regards further evaluation and followup to full resolution as indicated.  2. Details and other findings as discussed above.                                         Medications   sodium chloride 0.9% bolus 1,000 mL 1,000 mL (0 mLs Intravenous Stopped 2/24/24 2003)   ketorolac injection 15 mg (15 mg Intravenous Given 2/24/24 1635)   prochlorperazine injection Soln 10 mg (10 mg Intravenous Given 2/24/24 1642)   diphenhydrAMINE injection 25 mg (25 mg Intravenous Given 2/24/24 1639)   iohexoL (OMNIPAQUE 350) 350 mg iodine/mL injection (100 mLs Intravenous Given 2/24/24 1830)     Medical Decision Making  The patient presents with reports abdominal pain and nausea that started this am. The course/duration of symptoms is fluctuating in intensity.  The character of symptoms is crampy.  The degree at onset was minimal.  The Location of pain at onset was diffuse and abdominal.  The degree at present is moderate.  Radiating pain: none. There are exacerbating factors including eating and drinking. Therapy today: none.  Risk factors consist  of none.  Associated symptoms: nausea, denies vomiting, denies chest pain, denies diarrhea, denies back pain, denies shortness of breath, denies fever, denies chills, denies headache and denies dizziness.  Normal bm this morning.   Patient reports drinking 2 pints of vodka daily.    Amount and/or Complexity of Data Reviewed  Labs: ordered. Decision-making details documented in ED Course.  Radiology: ordered. Decision-making details documented in ED Course.  Discussion of management or test interpretation with external provider(s): Consulted Dr Sevilla (ER staff) who advised to call medicine for admission. Consulted medicine service.    Risk  Prescription drug management.      Additional MDM:   Differential Diagnosis:   Appendicitis, Diverticulitis, Pancreatitis, Pyelonephritis, AAA, Dissection, MI, Gastric Ulcer, Peptic Ulcer, Urinary retention, among others                ED Course as of 02/25/24 0256   Sat Feb 24, 2024 2026 Lipase(!): 412 [RB]   2026 WBC: 7.28 [RB]   2026 CT Abdomen Pelvis With IV Contrast NO Oral Contrast  Impression:  1. The pancreas is edematous with surrounding pronounced peripancreatic fat stranding and fluid in the left anterior pararenal space. This is consistent with acute pancreatitis. No pancreatic pseudocyst is identified. Correlate with clinical and laboratory findings as regards further evaluation and followup to full resolution as indicated.  2. Details and other findings as discussed above.   [RB]      ED Course User Index  [RB] Junior Brar ACNP                           Clinical Impression:  Final diagnoses:  [K85.20] Alcohol-induced acute pancreatitis, unspecified complication status (Primary)          ED Disposition Condition    AMA Stable                Junior Brar ACNP  02/24/24 2029       Marvin Sevilla MD  02/25/24 0256

## 2024-02-25 NOTE — CONSULTS
"[facility] Internal Medicine Consult Note     Date of Admit: 2/24/2024  Current Hospital Day: 1     Reason for Consult:      Acute Pancreatitis    Subjective:     History of Present Illness:  Luis Salcido is a 23 y.o. male who  has no past medical history on file.. Internal Medicine is being consulted for Acute Pancreatitis  The patient started having generalized abdominal pain this morning, non radiating, moderate intensity, accompanied with nausea, but no vomiting. Patient had no other complaints.  In the ED he was afebrile, VSS, had a lipase of 412, CBC and CMP normal, no DANIELLE, increased AST/ALT, hyperglycemia, or hypocalcemia, alcohol level 166, patient admits to heavy alcohol drinking, last drink being last night.      Review of Systems:    12 Point ROS Negative except for what is stated in HPI    Past Medical History: See above    Past Surgical History:  History reviewed. No pertinent surgical history.    Allergies:  Review of patient's allergies indicates:  No Known Allergies    Home Medications:  Prior to Admission medications    Medication Sig Start Date End Date Taking? Authorizing Provider   diclofenac (VOLTAREN) 50 MG EC tablet Take 1 tablet (50 mg total) by mouth 2 (two) times daily as needed (pain). 2/16/23   Malcolm Goodwin PA       Family History:  History reviewed. No pertinent family history.    Social History:  Social History     Tobacco Use    Smoking status: Every Day     Types: Vaping with nicotine    Smokeless tobacco: Never   Substance Use Topics    Alcohol use: Yes    Drug use: Yes     Types: Marijuana          Objective:   Vitals  Vitals  BP: 128/74  Temp: 98.2 °F (36.8 °C)  Temp Source: Temporal  Pulse: 62  Resp: 18  SpO2: 98 %  Height: 6' 5" (195.6 cm)  Weight: 81.3 kg (179 lb 3.7 oz)    Physical Examination:  General: Awake, alert, & oriented to person, place & time. No acute distress  Psychiatric: Mood and affect normal  HEENT: Normocephalic, atraumatic. Face symmetric.  Cardiovascular: " Regular rate & rhythm. Normal S1 & S2 w/out murmurs, rubs or gallops.  No JVD appreciated.  2+ pulses throughout.  Pulmonary: Bilateral symmetric chest rise. Non-labored, no wheezes, rhonchi or crackles are appreciated.  Abdominal:  Soft, nontender, nondistended. Bowel sounds present  Extremities: No clubbing, cyanosis or edema.  Skin:  Exposed skin is warm & dry.  Neuro:   Patient moves all extremities equally. Sensation intact bilateraly.    Laboratory:  CBC:   Lab Results   Component Value Date    WBC 7.28 02/24/2024    HGB 14.4 02/24/2024    HCT 42.1 02/24/2024     02/24/2024    MCV 92.3 02/24/2024    RDW 14.1 02/24/2024     BMP:   Lab Results   Component Value Date     02/24/2024    K 4.0 02/24/2024    CHLORIDE 107 02/24/2024    CO2 27 02/24/2024    BUN 15.7 02/24/2024    CREATININE 1.18 02/24/2024    GLUCOSE 93 02/24/2024    CALCIUM 8.7 02/24/2024     LFTs:   Lab Results   Component Value Date    ALBUMIN 4.1 02/24/2024    BILITOT 0.4 02/24/2024    BILIDIR 0.4 12/10/2021    IBILI 0.60 12/10/2021    AST 28 02/24/2024    ALKPHOS 68 02/24/2024    ALT 15 02/24/2024     Urinalysis:   Lab Results   Component Value Date    PHUA 7.0 02/24/2024    UROBILINOGEN Normal 02/24/2024    WBCUA 0-5 02/24/2024       CT Abdomen Pelvis With IV Contrast NO Oral Contrast 02/24/2024 (Preliminary)  This result has not been signed. Information might be incomplete.    Narrative  START OF REPORT:  Technique: CT of the abdomen and pelvis was performed with axial images as well as sagittal and coronal reconstruction images with intravenous contrast.    Comparison: None available.    Clinical History: Abdominal pain.    Dosage Information: Automated Exposure Control was utilized 305.49 mGy.cm.    Findings:  Lines and Tubes: None.  Thorax:  Lungs: The visualized lung bases appear unremarkable.  Pleura: No effusions or thickening.  Heart: The heart size is within normal limits.  Abdomen:  Abdominal Wall: No abdominal wall  pathology is seen.  Liver: The liver appears unremarkable.  Biliary System: No intrahepatic or extrahepatic biliary duct dilatation is seen.  Gallbladder: The gallbladder appears unremarkable.  Pancreas: The pancreas is edematous with surrounding pronounced peripancreatic fat stranding and fluid in the left anterior pararenal space. This is consistent with acute pancreatitis. No pancreatic pseudocyst is identified.  Spleen: The spleen appears unremarkable.  Adrenals: The adrenal glands appear unremarkable.  Kidneys: The kidneys appear unremarkable with no stones cysts masses or hydronephrosis.  Aorta: The abdominal aorta appears unremarkable.  IVC: Unremarkable.  Bowel:  Esophagus: The visualized esophagus appears unremarkable.  Stomach: The stomach appears unremarkable.  Duodenum: Unremarkable appearing duodenum.  Small Bowel: The small bowel appears unremarkable.  Colon: Nondistended.  Appendix: The appendix appears unremarkable and is partially seen on Series 2 Image 107.    Pelvis:  Bladder: The bladder appears unremarkable.  Male:  Prostate gland: The prostate gland appears unremarkable.    Bony structures:  Dorsal Spine: The visualized dorsal spine appears unremarkable.  Bony Pelvis: The visualized bony structures of the pelvis appear unremarkable.    Impression  1. The pancreas is edematous with surrounding pronounced peripancreatic fat stranding and fluid in the left anterior pararenal space. This is consistent with acute pancreatitis. No pancreatic pseudocyst is identified. Correlate with clinical and laboratory findings as regards further evaluation and followup to full resolution as indicated.  2. Details and other findings as discussed above.      Assessment & Plan:     Acute Alcoholic Pancreatitis  Clinical, laboratory and imaging evidence of mild AP  Received 1 L NS bolus  Alcohol history suggestive of alcoholic pancreatitis  Ideally patient would need to be admitted for fluid resuscitation and  advancing diet as tolerate, while observing for signs of withdrawals.  Patient wanted to leave AMA because he has to be at work tomorrow, stating that he can come back after work if symptoms persist as he will be off Monday  Patient understands his diagnosis, the role of alcohol on his presentation, understands the risks including deterioration of his condition to systemic inflammation and death, verbalize understanding and insists on discharge.    Dalia Mccartney MD  Internal Medicine - PGY-1

## 2024-02-25 NOTE — ED PROVIDER NOTES
Encounter Date: 2/24/2024    I, Marvin Sevilla MD, did conduct a face to face encounter with this patient initially seen by our advanced practice provider. I did perform a history and physical, did direct the evaluation and management, and do agree with the care as documented.         History     Chief Complaint   Patient presents with    Abdominal Pain    Nausea     Generalized abdominal pain and nausea since this morning     HPI  Review of patient's allergies indicates:  No Known Allergies  History reviewed. No pertinent past medical history.  History reviewed. No pertinent surgical history.  History reviewed. No pertinent family history.  Social History     Tobacco Use    Smoking status: Every Day     Types: Vaping with nicotine    Smokeless tobacco: Never   Substance Use Topics    Alcohol use: Yes    Drug use: Yes     Types: Marijuana     Review of Systems    Physical Exam     Initial Vitals [02/24/24 1542]   BP Pulse Resp Temp SpO2   (!) 159/98 67 18 98.2 °F (36.8 °C) 100 %      MAP       --         Physical Exam    ED Course   Procedures  Labs Reviewed   COMPREHENSIVE METABOLIC PANEL - Abnormal; Notable for the following components:       Result Value    Globulin 3.6 (*)     All other components within normal limits   LIPASE - Abnormal; Notable for the following components:    Lipase Level 412 (*)     All other components within normal limits   URINALYSIS, REFLEX TO URINE CULTURE - Abnormal; Notable for the following components:    Protein, UA 1+ (*)     Ketones, UA Trace (*)     Blood, UA Trace (*)     Mucous, UA Trace (*)     RBC, UA 6-10 (*)     All other components within normal limits   DRUG SCREEN, URINE (BEAKER) - Abnormal; Notable for the following components:    Cannabinoids, Urine Positive (*)     All other components within normal limits    Narrative:     Cut off concentrations:    Amphetamines - 1000 ng/ml  Barbiturates - 200 ng/ml  Benzodiazepine - 200 ng/ml  Cannabinoids (THC) - 50  ng/ml  Cocaine - 300 ng/ml  Fentanyl - 1.0 ng/ml  MDMA - 500 ng/ml  Opiates - 300 ng/ml   Phencyclidine (PCP) - 25 ng/ml    Specimen submitted for drug analysis and tested for pH and specific gravity in order to evaluate sample integrity. Suspect tampering if specific gravity is <1.003 and/or pH is not within the range of 4.5 - 8.0  False negatives may result form substances such as bleach added to urine.  False positives may result for the presence of a substance with similar chemical structure to the drug or its metabolite.    This test provides only a PRELIMINARY analytical test result. A more specific alternate chemical method must be used in order to obtain a confirmed analytical result. Gas chromatography/mass spectrometry (GC/MS) is the preferred confirmatory method. Other chemical confirmation methods are available. Clinical consideration and professional judgement should be applied to any drug of abuse test result, particularly when preliminary positive results are used.    Positive results will be confirmed only at the physicians request. Unconfirmed screening results are to be used only for medical purposes (treatment).        CBC WITH DIFFERENTIAL - Abnormal; Notable for the following components:    RBC 4.56 (*)     MCH 31.6 (*)     All other components within normal limits   CBC W/ AUTO DIFFERENTIAL    Narrative:     The following orders were created for panel order CBC auto differential.  Procedure                               Abnormality         Status                     ---------                               -----------         ------                     CBC with Differential[[id number]]       Abnormal            Final result                 Please view results for these tests on the individual orders.   EXTRA TUBES    Narrative:     The following orders were created for panel order EXTRA TUBES.  Procedure                               Abnormality         Status                     ---------                                -----------         ------                     Light Blue Top Hold[[id number]]                             Final result               Gold Top Hold[[id number]]                                   Final result               Pink Top Hold[[id number]]                                   Final result                 Please view results for these tests on the individual orders.   LIGHT BLUE TOP HOLD   GOLD TOP HOLD   PINK TOP HOLD          Imaging Results              CT Abdomen Pelvis With IV Contrast NO Oral Contrast (Final result)  Result time 02/25/24 10:15:19      Final result by Scout Elkins MD (02/25/24 10:15:19)                   Impression:      Edema surrounds the pancreas as would be seen with acute pancreatitis.      Electronically signed by: Scout Elkins  Date:    02/25/2024  Time:    10:15               Narrative:    EXAMINATION:  CT ABDOMEN PELVIS WITH IV CONTRAST    CLINICAL HISTORY:  Abdominal pain, acute, nonlocalized;    TECHNIQUE:  Multidetector IV contrast enhanced axial CT images of the abdomen and pelvis were obtained with coronal and sagittal reconstructions.    Automatic exposure control was utilized to reduce the patient's radiation dose.    DLP= 305    COMPARISON:  No prior imaging available for comparison.    FINDINGS:  01. HEPATOBILIARY: No focal hepatic lesion is identified, The gallbladder is normal.    02. SPLEEN: Normal    03. PANCREAS: Edema surrounds the pancreas.    04. ADRENALS: No adrenal nodules.    05. KIDNEYS: The right kidney demonstrates no stone, hydronephrosis, or hydroureter. No focal mass identified. The left kidney demonstrates no stone, hydronephrosis, or hydroureter. No focal mass identified.    06. LYMPHADENOPATHY/RETROPERITONEUM: There is no retroperitoneal lymphadenopathy. The abdominal aorta is normal in course and caliber.    07. BOWEL: No acute bowel related abnormalities. No evidence of appendiceal inflammation.    08. PELVIC VISCERA:  Normal. No pelvic mass.    09. PELVIC LYMPH NODES: No lymphadenopathy.    10. PERITONEUM/ABDOMINAL WALL: No ascites or implant.    11. SKELETAL: No aggressive appearing lytic/blastic lesion. No acute fractures, subluxations or dislocations.    12. LUNG BASES: The visualized lungs are unremarkable.                        Preliminary result by Wilson Michel MD (02/24/24 20:06:11)                   Impression:    1. The pancreas is edematous with surrounding pronounced peripancreatic fat stranding and fluid in the left anterior pararenal space. This is consistent with acute pancreatitis. No pancreatic pseudocyst is identified. Correlate with clinical and laboratory findings as regards further evaluation and followup to full resolution as indicated.  2. Details and other findings as discussed above.               Narrative:    START OF REPORT:  Technique: CT of the abdomen and pelvis was performed with axial images as well as sagittal and coronal reconstruction images with intravenous contrast.    Comparison: None available.    Clinical History: Abdominal pain.    Dosage Information: Automated Exposure Control was utilized 305.49 mGy.cm.    Findings:  Lines and Tubes: None.  Thorax:  Lungs: The visualized lung bases appear unremarkable.  Pleura: No effusions or thickening.  Heart: The heart size is within normal limits.  Abdomen:  Abdominal Wall: No abdominal wall pathology is seen.  Liver: The liver appears unremarkable.  Biliary System: No intrahepatic or extrahepatic biliary duct dilatation is seen.  Gallbladder: The gallbladder appears unremarkable.  Pancreas: The pancreas is edematous with surrounding pronounced peripancreatic fat stranding and fluid in the left anterior pararenal space. This is consistent with acute pancreatitis. No pancreatic pseudocyst is identified.  Spleen: The spleen appears unremarkable.  Adrenals: The adrenal glands appear unremarkable.  Kidneys: The kidneys appear unremarkable with no  stones cysts masses or hydronephrosis.  Aorta: The abdominal aorta appears unremarkable.  IVC: Unremarkable.  Bowel:  Esophagus: The visualized esophagus appears unremarkable.  Stomach: The stomach appears unremarkable.  Duodenum: Unremarkable appearing duodenum.  Small Bowel: The small bowel appears unremarkable.  Colon: Nondistended.  Appendix: The appendix appears unremarkable and is partially seen on Series 2 Image 107.    Pelvis:  Bladder: The bladder appears unremarkable.  Male:  Prostate gland: The prostate gland appears unremarkable.    Bony structures:  Dorsal Spine: The visualized dorsal spine appears unremarkable.  Bony Pelvis: The visualized bony structures of the pelvis appear unremarkable.                                         Medications   sodium chloride 0.9% bolus 1,000 mL 1,000 mL (0 mLs Intravenous Stopped 2/24/24 2003)   ketorolac injection 15 mg (15 mg Intravenous Given 2/24/24 1635)   prochlorperazine injection Soln 10 mg (10 mg Intravenous Given 2/24/24 1642)   diphenhydrAMINE injection 25 mg (25 mg Intravenous Given 2/24/24 1639)   iohexoL (OMNIPAQUE 350) 350 mg iodine/mL injection (100 mLs Intravenous Given 2/24/24 1830)     Medical Decision Making  Amount and/or Complexity of Data Reviewed  Labs: ordered. Decision-making details documented in ED Course.  Radiology: ordered. Decision-making details documented in ED Course.    Risk  Prescription drug management.               ED Course as of 03/12/24 0707   Sat Feb 24, 2024 2026 Lipase(!): 412 [RB]   2026 WBC: 7.28 [RB]   2026 CT Abdomen Pelvis With IV Contrast NO Oral Contrast  Impression:  1. The pancreas is edematous with surrounding pronounced peripancreatic fat stranding and fluid in the left anterior pararenal space. This is consistent with acute pancreatitis. No pancreatic pseudocyst is identified. Correlate with clinical and laboratory findings as regards further evaluation and followup to full resolution as indicated.  2. Details  and other findings as discussed above.   [RB]      ED Course User Index  [RB] Junior Brar, JILL                           Clinical Impression:  Final diagnoses:  [K85.20] Alcohol-induced acute pancreatitis, unspecified complication status (Primary)          ED Disposition Condition    AMA Marvin Rosado MD  03/12/24 0707

## 2024-04-26 ENCOUNTER — HOSPITAL ENCOUNTER (EMERGENCY)
Facility: HOSPITAL | Age: 24
Discharge: PSYCHIATRIC OR SUBSTANCE USE FACILITY | End: 2024-04-27
Attending: EMERGENCY MEDICINE
Payer: MEDICAID

## 2024-04-26 DIAGNOSIS — F29 PSYCHOSIS, UNSPECIFIED PSYCHOSIS TYPE: Primary | ICD-10-CM

## 2024-04-26 DIAGNOSIS — F10.920 ALCOHOLIC INTOXICATION WITHOUT COMPLICATION: ICD-10-CM

## 2024-04-26 LAB
ALBUMIN SERPL-MCNC: 4.5 G/DL (ref 3.5–5)
ALBUMIN/GLOB SERPL: 1.1 RATIO (ref 1.1–2)
ALP SERPL-CCNC: 83 UNIT/L (ref 40–150)
ALT SERPL-CCNC: 26 UNIT/L (ref 0–55)
AMPHET UR QL SCN: NEGATIVE
APAP SERPL-MCNC: <3 UG/ML (ref 10–30)
APPEARANCE UR: CLEAR
AST SERPL-CCNC: 51 UNIT/L (ref 5–34)
BACTERIA #/AREA URNS AUTO: ABNORMAL /HPF
BARBITURATE SCN PRESENT UR: NEGATIVE
BASOPHILS # BLD AUTO: 0.04 X10(3)/MCL
BASOPHILS NFR BLD AUTO: 0.9 %
BENZODIAZ UR QL SCN: NEGATIVE
BILIRUB SERPL-MCNC: 0.4 MG/DL
BILIRUB UR QL STRIP.AUTO: NEGATIVE
BUN SERPL-MCNC: 14 MG/DL (ref 8.9–20.6)
CALCIUM SERPL-MCNC: 8.8 MG/DL (ref 8.4–10.2)
CANNABINOIDS UR QL SCN: POSITIVE
CHLORIDE SERPL-SCNC: 108 MMOL/L (ref 98–107)
CO2 SERPL-SCNC: 22 MMOL/L (ref 22–29)
COCAINE UR QL SCN: NEGATIVE
COLOR UR AUTO: ABNORMAL
CREAT SERPL-MCNC: 1.2 MG/DL (ref 0.73–1.18)
EOSINOPHIL # BLD AUTO: 0.08 X10(3)/MCL (ref 0–0.9)
EOSINOPHIL NFR BLD AUTO: 1.9 %
ERYTHROCYTE [DISTWIDTH] IN BLOOD BY AUTOMATED COUNT: 14.4 % (ref 11.5–17)
ETHANOL SERPL-MCNC: 270 MG/DL
FENTANYL UR QL SCN: NEGATIVE
GFR SERPLBLD CREATININE-BSD FMLA CKD-EPI: >60 MLS/MIN/1.73/M2
GLOBULIN SER-MCNC: 4.1 GM/DL (ref 2.4–3.5)
GLUCOSE SERPL-MCNC: 91 MG/DL (ref 74–100)
GLUCOSE UR QL STRIP.AUTO: NORMAL
HCT VFR BLD AUTO: 43.9 % (ref 42–52)
HGB BLD-MCNC: 15.1 G/DL (ref 14–18)
IMM GRANULOCYTES # BLD AUTO: 0 X10(3)/MCL (ref 0–0.04)
IMM GRANULOCYTES NFR BLD AUTO: 0 %
KETONES UR QL STRIP.AUTO: NEGATIVE
LEUKOCYTE ESTERASE UR QL STRIP.AUTO: NEGATIVE
LYMPHOCYTES # BLD AUTO: 1.78 X10(3)/MCL (ref 0.6–4.6)
LYMPHOCYTES NFR BLD AUTO: 41.7 %
MCH RBC QN AUTO: 31.6 PG (ref 27–31)
MCHC RBC AUTO-ENTMCNC: 34.4 G/DL (ref 33–36)
MCV RBC AUTO: 91.8 FL (ref 80–94)
MDMA UR QL SCN: NEGATIVE
MONOCYTES # BLD AUTO: 0.23 X10(3)/MCL (ref 0.1–1.3)
MONOCYTES NFR BLD AUTO: 5.4 %
MUCOUS THREADS URNS QL MICRO: ABNORMAL /LPF
NEUTROPHILS # BLD AUTO: 2.14 X10(3)/MCL (ref 2.1–9.2)
NEUTROPHILS NFR BLD AUTO: 50.1 %
NITRITE UR QL STRIP.AUTO: NEGATIVE
NRBC BLD AUTO-RTO: 0 %
OPIATES UR QL SCN: NEGATIVE
PCP UR QL: NEGATIVE
PH UR STRIP.AUTO: 6 [PH]
PH UR: 6 [PH] (ref 3–11)
PLATELET # BLD AUTO: 239 X10(3)/MCL (ref 130–400)
PMV BLD AUTO: 9.6 FL (ref 7.4–10.4)
POTASSIUM SERPL-SCNC: 3.8 MMOL/L (ref 3.5–5.1)
PROT SERPL-MCNC: 8.6 GM/DL (ref 6.4–8.3)
PROT UR QL STRIP.AUTO: ABNORMAL
RBC # BLD AUTO: 4.78 X10(6)/MCL (ref 4.7–6.1)
RBC #/AREA URNS AUTO: ABNORMAL /HPF
RBC UR QL AUTO: ABNORMAL
SARS-COV-2 RDRP RESP QL NAA+PROBE: NEGATIVE
SODIUM SERPL-SCNC: 143 MMOL/L (ref 136–145)
SP GR UR STRIP.AUTO: 1.02 (ref 1–1.03)
SPECIFIC GRAVITY, URINE AUTO (.000) (OHS): 1.02 (ref 1–1.03)
SQUAMOUS #/AREA URNS LPF: ABNORMAL /HPF
TSH SERPL-ACNC: 1.04 UIU/ML (ref 0.35–4.94)
UROBILINOGEN UR STRIP-ACNC: NORMAL
WBC # SPEC AUTO: 4.27 X10(3)/MCL (ref 4.5–11.5)
WBC #/AREA URNS AUTO: ABNORMAL /HPF

## 2024-04-26 PROCEDURE — 82077 ASSAY SPEC XCP UR&BREATH IA: CPT | Performed by: EMERGENCY MEDICINE

## 2024-04-26 PROCEDURE — 99285 EMERGENCY DEPT VISIT HI MDM: CPT

## 2024-04-26 PROCEDURE — 84443 ASSAY THYROID STIM HORMONE: CPT | Performed by: EMERGENCY MEDICINE

## 2024-04-26 PROCEDURE — 80307 DRUG TEST PRSMV CHEM ANLYZR: CPT | Performed by: EMERGENCY MEDICINE

## 2024-04-26 PROCEDURE — 80053 COMPREHEN METABOLIC PANEL: CPT | Performed by: EMERGENCY MEDICINE

## 2024-04-26 PROCEDURE — 85025 COMPLETE CBC W/AUTO DIFF WBC: CPT | Performed by: EMERGENCY MEDICINE

## 2024-04-26 PROCEDURE — 80143 DRUG ASSAY ACETAMINOPHEN: CPT | Performed by: EMERGENCY MEDICINE

## 2024-04-26 PROCEDURE — 87635 SARS-COV-2 COVID-19 AMP PRB: CPT | Performed by: EMERGENCY MEDICINE

## 2024-04-26 PROCEDURE — 81001 URINALYSIS AUTO W/SCOPE: CPT | Performed by: EMERGENCY MEDICINE

## 2024-04-26 RX ORDER — HALOPERIDOL 5 MG/1
5 TABLET ORAL EVERY 4 HOURS PRN
Status: DISCONTINUED | OUTPATIENT
Start: 2024-04-26 | End: 2024-04-26

## 2024-04-26 RX ORDER — ZIPRASIDONE MESYLATE 20 MG/ML
20 INJECTION, POWDER, LYOPHILIZED, FOR SOLUTION INTRAMUSCULAR
Status: DISCONTINUED | OUTPATIENT
Start: 2024-04-26 | End: 2024-04-27

## 2024-04-26 RX ORDER — DIPHENHYDRAMINE HYDROCHLORIDE 50 MG/ML
50 INJECTION INTRAMUSCULAR; INTRAVENOUS
Status: DISCONTINUED | OUTPATIENT
Start: 2024-04-26 | End: 2024-04-27 | Stop reason: HOSPADM

## 2024-04-26 RX ORDER — WATER FOR INJECTION,STERILE
VIAL (ML) INJECTION
Status: DISCONTINUED
Start: 2024-04-26 | End: 2024-04-27 | Stop reason: HOSPADM

## 2024-04-27 ENCOUNTER — HOSPITAL ENCOUNTER (INPATIENT)
Facility: HOSPITAL | Age: 24
LOS: 6 days | Discharge: HOME | DRG: 885 | End: 2024-05-03
Attending: PSYCHIATRY & NEUROLOGY | Admitting: PSYCHIATRY & NEUROLOGY
Payer: MEDICAID

## 2024-04-27 VITALS
BODY MASS INDEX: 23.19 KG/M2 | DIASTOLIC BLOOD PRESSURE: 93 MMHG | TEMPERATURE: 98 F | WEIGHT: 175 LBS | HEART RATE: 70 BPM | SYSTOLIC BLOOD PRESSURE: 172 MMHG | HEIGHT: 73 IN | OXYGEN SATURATION: 97 % | RESPIRATION RATE: 18 BRPM

## 2024-04-27 DIAGNOSIS — R45.850 HOMICIDAL IDEATIONS: ICD-10-CM

## 2024-04-27 DIAGNOSIS — F41.0 PANIC DISORDER WITHOUT AGORAPHOBIA: Primary | ICD-10-CM

## 2024-04-27 PROBLEM — R46.89 AGGRESSIVE BEHAVIOR: Status: ACTIVE | Noted: 2024-04-27

## 2024-04-27 PROBLEM — F10.10 ETOH ABUSE: Status: ACTIVE | Noted: 2024-04-27

## 2024-04-27 LAB — ETHANOL SERPL-MCNC: 138 MG/DL

## 2024-04-27 PROCEDURE — 25000003 PHARM REV CODE 250: Performed by: PSYCHIATRY & NEUROLOGY

## 2024-04-27 PROCEDURE — 82077 ASSAY SPEC XCP UR&BREATH IA: CPT | Performed by: EMERGENCY MEDICINE

## 2024-04-27 PROCEDURE — 11400000 HC PSYCH PRIVATE ROOM

## 2024-04-27 RX ORDER — IBUPROFEN 400 MG/1
400 TABLET ORAL EVERY 6 HOURS PRN
Status: DISCONTINUED | OUTPATIENT
Start: 2024-04-27 | End: 2024-04-29

## 2024-04-27 RX ORDER — ALUMINUM HYDROXIDE, MAGNESIUM HYDROXIDE, AND SIMETHICONE 1200; 120; 1200 MG/30ML; MG/30ML; MG/30ML
30 SUSPENSION ORAL EVERY 6 HOURS PRN
Status: DISCONTINUED | OUTPATIENT
Start: 2024-04-27 | End: 2024-05-03 | Stop reason: HOSPADM

## 2024-04-27 RX ORDER — IBUPROFEN 200 MG
1 TABLET ORAL DAILY
Status: DISCONTINUED | OUTPATIENT
Start: 2024-04-27 | End: 2024-04-27

## 2024-04-27 RX ORDER — ACETAMINOPHEN 325 MG/1
650 TABLET ORAL EVERY 6 HOURS PRN
Status: DISCONTINUED | OUTPATIENT
Start: 2024-04-27 | End: 2024-05-03 | Stop reason: HOSPADM

## 2024-04-27 RX ORDER — CLONIDINE HYDROCHLORIDE 0.1 MG/1
0.1 TABLET ORAL 4 TIMES DAILY
Status: DISCONTINUED | OUTPATIENT
Start: 2024-04-27 | End: 2024-04-29

## 2024-04-27 RX ORDER — OLANZAPINE 10 MG/1
10 TABLET ORAL EVERY 8 HOURS PRN
Status: DISCONTINUED | OUTPATIENT
Start: 2024-04-27 | End: 2024-05-03 | Stop reason: HOSPADM

## 2024-04-27 RX ORDER — LORAZEPAM 1 MG/1
1 TABLET ORAL 4 TIMES DAILY
Status: DISCONTINUED | OUTPATIENT
Start: 2024-04-27 | End: 2024-04-29

## 2024-04-27 RX ORDER — HYDROXYZINE PAMOATE 25 MG/1
50 CAPSULE ORAL EVERY 6 HOURS PRN
Status: DISCONTINUED | OUTPATIENT
Start: 2024-04-27 | End: 2024-05-03 | Stop reason: HOSPADM

## 2024-04-27 RX ADMIN — CLONIDINE HYDROCHLORIDE 0.1 MG: 0.1 TABLET ORAL at 08:04

## 2024-04-27 RX ADMIN — LORAZEPAM 1 MG: 1 TABLET ORAL at 04:04

## 2024-04-27 RX ADMIN — CLONIDINE HYDROCHLORIDE 0.1 MG: 0.1 TABLET ORAL at 10:04

## 2024-04-27 RX ADMIN — CLONIDINE HYDROCHLORIDE 0.1 MG: 0.1 TABLET ORAL at 04:04

## 2024-04-27 RX ADMIN — LORAZEPAM 1 MG: 1 TABLET ORAL at 08:04

## 2024-04-27 RX ADMIN — LORAZEPAM 1 MG: 1 TABLET ORAL at 01:04

## 2024-04-27 RX ADMIN — LORAZEPAM 1 MG: 1 TABLET ORAL at 10:04

## 2024-04-27 RX ADMIN — CLONIDINE HYDROCHLORIDE 0.1 MG: 0.1 TABLET ORAL at 01:04

## 2024-04-27 NOTE — HPI
23 yo male admitted to [facility] for behavioral disturbances.  Apparently he gets very angry and aggressive when he drinks Etoh.  He denies any medical illnesses.  No N/V/D/C.  No fever/chills.  No chest pain/SOB.  Hospitalist have been consulted for medical evaluation.  He voices no complaints at this time.

## 2024-04-27 NOTE — CONSULTS
[facility] Hospital Medicine  Consult Note    Patient Name: Luis Salcido  MRN: [id number]  Admission Date: 4/27/2024  Hospital Length of Stay: 0 days  Attending Physician: Ran Crespo MD   Primary Care Provider: Karolina, Primary Doctor           Patient information was obtained from patient and ER records.     Consults  Subjective:     Principal Problem: Aggressive behavior    Chief Complaint: No chief complaint on file.       HPI: 23 yo male admitted to [facility] for behavioral disturbances.  Apparently he gets very angry and aggressive when he drinks Etoh.  He denies any medical illnesses.  No N/V/D/C.  No fever/chills.  No chest pain/SOB.  Hospitalist have been consulted for medical evaluation.  He voices no complaints at this time.      No past medical history on file.    No past surgical history on file.    Review of patient's allergies indicates:  No Known Allergies    Current Facility-Administered Medications   Medication Dose Route Frequency Provider Last Rate Last Admin    acetaminophen tablet 650 mg  650 mg Oral Q6H PRN Ran Crespo MD        aluminum-magnesium hydroxide-simethicone 200-200-20 mg/5 mL suspension 30 mL  30 mL Oral Q6H PRN Ran Crespo MD        cloNIDine tablet 0.1 mg  0.1 mg Oral QID Ran Crespo MD   0.1 mg at 04/27/24 1008    hydrOXYzine pamoate capsule 50 mg  50 mg Oral Q6H PRN Ran Crespo MD        ibuprofen tablet 400 mg  400 mg Oral Q6H PRN Ran Crespo MD        LORazepam tablet 1 mg  1 mg Oral QIRan Solano MD   1 mg at 04/27/24 1008    OLANZapine tablet 10 mg  10 mg Oral Q8H PRN Ran Crespo MD         Family History    None       Tobacco Use    Smoking status: Every Day     Types: Vaping with nicotine    Smokeless tobacco: Never   Substance and Sexual Activity    Alcohol use: Yes    Drug use: Yes     Types: Marijuana    Sexual activity: Not on file     Review of Systems   Constitutional: Negative.    HENT: Negative.      Eyes: Negative.    Respiratory: Negative.     Cardiovascular: Negative.    Gastrointestinal: Negative.    Endocrine: Negative.    Genitourinary: Negative.    Musculoskeletal: Negative.    Skin: Negative.    Allergic/Immunologic: Negative.    Neurological: Negative.    Hematological: Negative.    Psychiatric/Behavioral: Negative.       Objective:     Vital Signs (Most Recent):  Temp: 98.2 °F (36.8 °C) (04/27/24 0925)  Pulse: (!) 58 (04/27/24 0925)  Resp: 16 (04/27/24 0925)  BP: (!) 155/101 (04/27/24 0925)  SpO2: 99 % (04/27/24 0925) Vital Signs (24h Range):  Temp:  [97.6 °F (36.4 °C)-99 °F (37.2 °C)] 98.2 °F (36.8 °C)  Pulse:  [58-95] 58  Resp:  [16-18] 16  SpO2:  [95 %-99 %] 99 %  BP: (125-172)/() 155/101     Weight: 79.8 kg (175 lb 14.8 oz)  Body mass index is 20.86 kg/m².     Physical Exam  Constitutional:       Appearance: Normal appearance. He is normal weight.   HENT:      Head: Normocephalic and atraumatic.      Nose: Nose normal.      Mouth/Throat:      Mouth: Mucous membranes are moist.      Pharynx: Oropharynx is clear.   Eyes:      Extraocular Movements: Extraocular movements intact and EOM normal.      Conjunctiva/sclera: Conjunctivae normal.      Pupils: Pupils are equal, round, and reactive to light.   Cardiovascular:      Rate and Rhythm: Normal rate and regular rhythm.      Pulses: Normal pulses.      Heart sounds: Normal heart sounds.   Pulmonary:      Effort: Pulmonary effort is normal.      Breath sounds: Normal breath sounds.   Abdominal:      General: Bowel sounds are normal.      Palpations: Abdomen is soft.   Musculoskeletal:         General: Normal range of motion.      Cervical back: Normal range of motion and neck supple.   Skin:     General: Skin is warm and dry.      Capillary Refill: Capillary refill takes 2 to 3 seconds.   Neurological:      General: No focal deficit present.      Mental Status: He is alert and oriented to person, place, and time. Mental status is at baseline.  "  Psychiatric:         Mood and Affect: Mood normal.         Behavior: Behavior normal.         Thought Content: Thought content normal.         Judgment: Judgment normal.         CRANIAL NERVES     CN I  cranial nerve I not tested    CN II   Visual fields full to confrontation.     CN III, IV, VI   Pupils are equal, round, and reactive to light.  Extraocular motions are normal.   CN III: no CN III palsy  CN VI: no CN VI palsy    CN V   Facial sensation intact.     CN VIII   CN VIII normal.     CN IX, X   CN IX normal.   CN X normal.     CN XI   CN XI normal.     CN XII   CN XII normal.         Significant Labs: All pertinent labs within the past 24 hours have been reviewed.  BMP:   Recent Labs   Lab 04/26/24  2138      K 3.8   CO2 22   BUN 14.0   CREATININE 1.20*   CALCIUM 8.8     CBC:   Recent Labs   Lab 04/26/24 2138   WBC 4.27*   HGB 15.1   HCT 43.9        CMP:   Recent Labs   Lab 04/26/24 2138      K 3.8   CO2 22   BUN 14.0   CREATININE 1.20*   CALCIUM 8.8   ALBUMIN 4.5   BILITOT 0.4   ALKPHOS 83   AST 51*   ALT 26     Magnesium: No results for input(s): "MG" in the last 48 hours.    Significant Imaging: I have reviewed all pertinent imaging results/findings within the past 24 hours.  Assessment/Plan:     * Aggressive behavior  Admit to U  Review home meds  OOB  ambulate      ETOH abuse  Advise to stop        VTE Risk Mitigation (From admission, onward)      None        Advise to stop smoking.  Can offer nicotine patch. He is not interested in stopping(cessation=3mins)        Thank you for your consult. I will sign off. Please contact us if you have any additional questions.    Carson Harmon MD  Department of Hospital Medicine   [facility]      "

## 2024-04-27 NOTE — SUBJECTIVE & OBJECTIVE
No past medical history on file.    No past surgical history on file.    Review of patient's allergies indicates:  No Known Allergies    Current Facility-Administered Medications   Medication Dose Route Frequency Provider Last Rate Last Admin    acetaminophen tablet 650 mg  650 mg Oral Q6H PRN Ran Crespo MD        aluminum-magnesium hydroxide-simethicone 200-200-20 mg/5 mL suspension 30 mL  30 mL Oral Q6H PRN Ran Crespo MD        cloNIDine tablet 0.1 mg  0.1 mg Oral QID Ran Crespo MD   0.1 mg at 04/27/24 1008    hydrOXYzine pamoate capsule 50 mg  50 mg Oral Q6H PRN Ran Crespo MD        ibuprofen tablet 400 mg  400 mg Oral Q6H PRN Ran Crespo MD        LORazepam tablet 1 mg  1 mg Oral QID Ran Crespo MD   1 mg at 04/27/24 1008    OLANZapine tablet 10 mg  10 mg Oral Q8H PRN Ran Crespo MD         Family History    None       Tobacco Use    Smoking status: Every Day     Types: Vaping with nicotine    Smokeless tobacco: Never   Substance and Sexual Activity    Alcohol use: Yes    Drug use: Yes     Types: Marijuana    Sexual activity: Not on file     Review of Systems   Constitutional: Negative.    HENT: Negative.     Eyes: Negative.    Respiratory: Negative.     Cardiovascular: Negative.    Gastrointestinal: Negative.    Endocrine: Negative.    Genitourinary: Negative.    Musculoskeletal: Negative.    Skin: Negative.    Allergic/Immunologic: Negative.    Neurological: Negative.    Hematological: Negative.    Psychiatric/Behavioral: Negative.       Objective:     Vital Signs (Most Recent):  Temp: 98.2 °F (36.8 °C) (04/27/24 0925)  Pulse: (!) 58 (04/27/24 0925)  Resp: 16 (04/27/24 0925)  BP: (!) 155/101 (04/27/24 0925)  SpO2: 99 % (04/27/24 0925) Vital Signs (24h Range):  Temp:  [97.6 °F (36.4 °C)-99 °F (37.2 °C)] 98.2 °F (36.8 °C)  Pulse:  [58-95] 58  Resp:  [16-18] 16  SpO2:  [95 %-99 %] 99 %  BP: (125-172)/() 155/101     Weight: 79.8 kg (175 lb 14.8 oz)  Body mass index is  20.86 kg/m².     Physical Exam  Constitutional:       Appearance: Normal appearance. He is normal weight.   HENT:      Head: Normocephalic and atraumatic.      Nose: Nose normal.      Mouth/Throat:      Mouth: Mucous membranes are moist.      Pharynx: Oropharynx is clear.   Eyes:      Extraocular Movements: Extraocular movements intact and EOM normal.      Conjunctiva/sclera: Conjunctivae normal.      Pupils: Pupils are equal, round, and reactive to light.   Cardiovascular:      Rate and Rhythm: Normal rate and regular rhythm.      Pulses: Normal pulses.      Heart sounds: Normal heart sounds.   Pulmonary:      Effort: Pulmonary effort is normal.      Breath sounds: Normal breath sounds.   Abdominal:      General: Bowel sounds are normal.      Palpations: Abdomen is soft.   Musculoskeletal:         General: Normal range of motion.      Cervical back: Normal range of motion and neck supple.   Skin:     General: Skin is warm and dry.      Capillary Refill: Capillary refill takes 2 to 3 seconds.   Neurological:      General: No focal deficit present.      Mental Status: He is alert and oriented to person, place, and time. Mental status is at baseline.   Psychiatric:         Mood and Affect: Mood normal.         Behavior: Behavior normal.         Thought Content: Thought content normal.         Judgment: Judgment normal.         CRANIAL NERVES     CN I  cranial nerve I not tested    CN II   Visual fields full to confrontation.     CN III, IV, VI   Pupils are equal, round, and reactive to light.  Extraocular motions are normal.   CN III: no CN III palsy  CN VI: no CN VI palsy    CN V   Facial sensation intact.     CN VIII   CN VIII normal.     CN IX, X   CN IX normal.   CN X normal.     CN XI   CN XI normal.     CN XII   CN XII normal.         Significant Labs: All pertinent labs within the past 24 hours have been reviewed.  BMP:   Recent Labs   Lab 04/26/24  2138      K 3.8   CO2 22   BUN 14.0   CREATININE 1.20*  "  CALCIUM 8.8     CBC:   Recent Labs   Lab 04/26/24  2138   WBC 4.27*   HGB 15.1   HCT 43.9        CMP:   Recent Labs   Lab 04/26/24  2138      K 3.8   CO2 22   BUN 14.0   CREATININE 1.20*   CALCIUM 8.8   ALBUMIN 4.5   BILITOT 0.4   ALKPHOS 83   AST 51*   ALT 26     Magnesium: No results for input(s): "MG" in the last 48 hours.    Significant Imaging: I have reviewed all pertinent imaging results/findings within the past 24 hours.  "

## 2024-04-27 NOTE — GROUP NOTE
Group Psychotherapy       Group Focus: Stress Management      Number of patients in attendance: 4    Group Start Time: 1130  Group End Time:  1215  Groups Date: 4/27/2024  Group Topic:  Behavioral Health  Group Department: [facility]  Group Facilitators:  Gladys James RN  _____________________________________________________________________    Patient Name: Luis Salcido  MRN: [id number]  Patient Class: IP- Psych   Admission Date\Time: 4/27/2024  9:25 AM  Hospital Length of Stay: 0  Primary Care Provider: Karolina, Primary Doctor     Referred by: Behavioral Medicine Unit Treatment Team     Target symptoms: Poor Coping Skills     Patient's response to treatment: Active Listening     Progress toward goals: Progressing slowly     Interval History: attended and participated in group     Diagnosis: depression      Plan: Continue treatment on BMU

## 2024-04-27 NOTE — PLAN OF CARE
INITIATED PLAN OF CARE    Problem: Adult Behavioral Health Plan of Care  Goal: Plan of Care Review  Outcome: Progressing  Flowsheets (Taken 4/27/2024 1013)  Patient Agreement with Plan of Care: agrees  Plan of Care Reviewed With: patient  Goal: Patient-Specific Goal (Individualization)  Outcome: Progressing  Flowsheets (Taken 4/27/2024 1013)  Patient Personal Strengths:   expressive of emotions   expressive of needs   family/social support   insight into illness/situation   intellectual cognitive skills   positive attitude   realistic evaluation of current/future capabilities   positive vocational history   stable living environment  Patient Vulnerabilities:   family/relationship conflict   substance abuse/addiction   poor impulse control  Individualized Care Needs: medication management, positive coping skills during conflict  Anxieties, Fears or Concerns: how long will he be here.  Patient/Family-Specific Goals (Include Timeframe): to control his temper and return home with mother  Goal: Adheres to Safety Considerations for Self and Others  Outcome: Progressing  Intervention: Develop and Maintain Individualized Safety Plan  Flowsheets (Taken 4/27/2024 1013)  Safety Measures:   belongings check completed   clinical history reviewed   environmental rounds completed   safety rounds completed   suicide assessment completed  Goal: Absence of New-Onset Illness or Injury  Outcome: Progressing  Intervention: Identify and Manage Fall Risk  Flowsheets (Taken 4/27/2024 1013)  Safety Measures:   belongings check completed   clinical history reviewed   environmental rounds completed   safety rounds completed   suicide assessment completed  Intervention: Prevent Infection  Flowsheets (Taken 4/27/2024 1013)  Infection Prevention:   hand hygiene promoted   rest/sleep promoted  Goal: Optimized Coping Skills in Response to Life Stressors  Outcome: Progressing  Intervention: Promote Effective Coping Strategies  Flowsheets (Taken  4/27/2024 1013)  Supportive Measures:   active listening utilized   counseling provided   decision-making supported   goal-setting facilitated   positive reinforcement provided   problem-solving facilitated   relaxation techniques promoted   self-care encouraged   self-reflection promoted   self-responsibility promoted   verbalization of feelings encouraged  Goal: Develops/Participates in Therapeutic [place] to Support Successful Transition  Outcome: Progressing  Intervention: Foster Therapeutic [place]  Flowsheets (Taken 4/27/2024 1013)  Trust Relationship/Rapport:   care explained   choices provided   emotional support provided   empathic listening provided   questions answered   questions encouraged   reassurance provided   thoughts/feelings acknowledged  Intervention: Mutually Develop Transition Plan  Flowsheets (Taken 4/27/2024 1013)  Current Discharge Risk: substance use/abuse  Outpatient/Agency/Support Group Needs:   outpatient counseling   outpatient medication management   outpatient psychiatric care (specify)   outpatient substance abuse treatment (specify)  Transition Support: follow-up care discussed  Anticipated Discharge Disposition:   home or self-care   home with family  Patient/Family Anticipated Services at Transition: rehabilitation services  Patient/Family Anticipates Transition to:   home   home with family  Concerns to be Addressed:   coping/stress   discharge planning   mental health   substance/tobacco abuse/use  Goal: Rounds/Family Conference  Outcome: Progressing     Problem: Depression  Goal: Improved Mood  Outcome: Progressing  Intervention: Monitor and Manage Depressive Symptoms  Flowsheets (Taken 4/27/2024 1013)  Supportive Measures:   active listening utilized   counseling provided   decision-making supported   goal-setting facilitated   positive reinforcement provided   problem-solving facilitated   relaxation techniques promoted   self-care encouraged   self-reflection promoted    self-responsibility promoted   verbalization of feelings encouraged  Family/Support System Care: self-care encouraged     Problem: Excessive Substance Use  Goal: Improved Behavioral Control (Excessive Substance Use)  Outcome: Progressing  Intervention: Promote Behavior and Impulse Control  Flowsheets (Taken 4/27/2024 1013)  Behavior Management:   boundaries reinforced   impulse control promoted  Goal: Increased Participation and Engagement (Excessive Substance Use)  Outcome: Progressing  Intervention: Facilitate Participation and Engagement  Flowsheets (Taken 4/27/2024 1013)  Supportive Measures:   active listening utilized   counseling provided   decision-making supported   goal-setting facilitated   positive reinforcement provided   problem-solving facilitated   relaxation techniques promoted   self-care encouraged   self-reflection promoted   self-responsibility promoted   verbalization of feelings encouraged
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
## 2024-04-28 PROBLEM — F33.9 RECURRENT MAJOR DEPRESSIVE DISORDER: Status: ACTIVE | Noted: 2024-04-28

## 2024-04-28 PROBLEM — F44.5 CONVERSION DISORDER WITH ATTACKS OR SEIZURES: Status: ACTIVE | Noted: 2024-04-28

## 2024-04-28 PROBLEM — F41.0 PANIC DISORDER WITHOUT AGORAPHOBIA: Status: ACTIVE | Noted: 2024-04-28

## 2024-04-28 PROBLEM — F10.20 ALCOHOL USE DISORDER, SEVERE, DEPENDENCE: Status: ACTIVE | Noted: 2024-04-28

## 2024-04-28 PROBLEM — F12.20 CANNABIS USE DISORDER, MODERATE, DEPENDENCE: Status: ACTIVE | Noted: 2024-04-28

## 2024-04-28 PROBLEM — R45.850 HOMICIDAL IDEATION: Status: ACTIVE | Noted: 2024-04-28

## 2024-04-28 LAB
CHOLEST SERPL-MCNC: 179 MG/DL
CHOLEST/HDLC SERPL: 2 {RATIO} (ref 0–5)
GLUCOSE P FAST SERPL-MCNC: 98 MG/DL (ref 70–100)
HDLC SERPL-MCNC: 75 MG/DL (ref 35–60)
LDLC SERPL CALC-MCNC: 91 MG/DL (ref 50–140)
T PALLIDUM AB SER QL: NONREACTIVE
TRIGL SERPL-MCNC: 66 MG/DL (ref 34–140)
VLDLC SERPL CALC-MCNC: 13 MG/DL

## 2024-04-28 PROCEDURE — 80061 LIPID PANEL: CPT | Performed by: PSYCHIATRY & NEUROLOGY

## 2024-04-28 PROCEDURE — 11400000 HC PSYCH PRIVATE ROOM

## 2024-04-28 PROCEDURE — 25000003 PHARM REV CODE 250: Performed by: PSYCHIATRY & NEUROLOGY

## 2024-04-28 PROCEDURE — 86780 TREPONEMA PALLIDUM: CPT | Performed by: PSYCHIATRY & NEUROLOGY

## 2024-04-28 PROCEDURE — 36415 COLL VENOUS BLD VENIPUNCTURE: CPT | Performed by: PSYCHIATRY & NEUROLOGY

## 2024-04-28 PROCEDURE — 82947 ASSAY GLUCOSE BLOOD QUANT: CPT | Performed by: PSYCHIATRY & NEUROLOGY

## 2024-04-28 RX ORDER — ESCITALOPRAM OXALATE 10 MG/1
10 TABLET ORAL DAILY
Status: DISCONTINUED | OUTPATIENT
Start: 2024-04-28 | End: 2024-04-30

## 2024-04-28 RX ADMIN — CLONIDINE HYDROCHLORIDE 0.1 MG: 0.1 TABLET ORAL at 08:04

## 2024-04-28 RX ADMIN — LORAZEPAM 1 MG: 1 TABLET ORAL at 08:04

## 2024-04-28 RX ADMIN — ESCITALOPRAM OXALATE 10 MG: 10 TABLET ORAL at 12:04

## 2024-04-28 RX ADMIN — LORAZEPAM 1 MG: 1 TABLET ORAL at 12:04

## 2024-04-28 RX ADMIN — CLONIDINE HYDROCHLORIDE 0.1 MG: 0.1 TABLET ORAL at 12:04

## 2024-04-28 RX ADMIN — LORAZEPAM 1 MG: 1 TABLET ORAL at 04:04

## 2024-04-28 RX ADMIN — CLONIDINE HYDROCHLORIDE 0.1 MG: 0.1 TABLET ORAL at 04:04

## 2024-04-28 NOTE — ASSESSMENT & PLAN NOTE
Patient was this time will be managed with trials of SSRI along with atypical anxiolytic to address issues in terms of what appears to be conversion with fit.  We will need to get additional collateral information.   will need to work with patient was directly hope develop alternative social leisure skills as well as develop different strategies for coping with feeling anxious and overwhelmed.

## 2024-04-28 NOTE — ASSESSMENT & PLAN NOTE
Patient this time will be evaluated for overall management in terms of substance use.  Patient was need careful education regarding need to abstain from all substances particularly cannabis was made further exacerbate destabilized his mood and anxiety.
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
## 2024-04-28 NOTE — ASSESSMENT & PLAN NOTE
Patient was with a history of full-blown panic so we will need to look at trials of SSRI in conjunction with atypical anxiolytic versus low-dose antidepressant to mitigate difficulties in terms of anxiety and kindling of illness.

## 2024-04-28 NOTE — GROUP NOTE
Education Group      Group Focus: Promoting Healthy Lifestyles      Number of patients in attendance: 4    Group Start Time: 1115  Group End Time:  1200  Groups Date: 4/28/2024  Group Topic:  Behavioral Health  Group Department: [facility]  Group Facilitators:  Fransisca Yu LPN  _____________________________________________________________________    Patient Name: Luis Salcido  MRN: [id number]  Patient Class: IP- Psych   Admission Date\Time: 4/27/2024  9:25 AM  Hospital Length of Stay: 1  Primary Care Provider: Karolina Primary Doctor     Referred by: Behavioral Medicine Unit Treatment Team     Target symptoms: Poor Coping Skills     Patient's response to treatment: Active Listening, Self-disclosure, and Feedback given to another patient     Progress toward goals: Progressing adequately     Interval History:  Verbalized understanding. Good participation. Completed team building exercise.     Diagnosis: aggressive     Plan: Continue treatment on BMU

## 2024-04-28 NOTE — ASSESSMENT & PLAN NOTE
Patient was this time with clear evidence of alcohol use disorder such as impedes his ability to function safely.  At this time we will place patient was on trials of Ativan to help mitigate difficulties in terms of withdrawal from alcohol.  Patient was also been placed on trials of Catapres facilitate withdrawal.  We will proceed evaluate patient was we move forward.    Patient was need to be educated about overall potential need to go forward to residential level of care versus [facility] level of care for substance treatment.  Patient this time clearly with strong issues of substance use disorder complicated by cannabis also alcohol.  Patient was need much education.
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
## 2024-04-28 NOTE — NURSING
"Daily Nursing Note:      Behavior:    Patient (Luis Salcido is a 24 y.o. male, : 2000, MRN: [id number]) demonstrating an affect that was anxious. Luis demonstrating mood that is anxious. Luis had an appearance that was clean. Luis denies suicidal ideation. Luis denies suicide plan. Luis denies homicidal ideation. Luis denies hallucinations.    Luis's  height is 6' 5" (1.956 m) and weight is 80.8 kg (178 lb 2.1 oz). His temperature is 97.4 °F (36.3 °C). His blood pressure is 124/83 and his pulse is 66. His respiration is 20 and oxygen saturation is 98%.     Luis's last BM was noted on: 2024      Intervention:    Encourage Luis to perform self-hygiene, grooming, and changing of clothing. Monitor Luis's behavior and program compliance. Monitor Luis for suicidal ideation, homicidal ideation, sleep disturbance, and hallucinations. Encourage Luis to eat all portions of meals and assess for meal preferences.  Ensure hydration by offering fluids. Notify the Physician for any medication refusal and any change in patient condition.      Response:    Luis verbalizes understand of unit process and procedures. Luis is compliant with medications and treatment.    Plan:     Continue to monitor per MD orders; maintain patient safety. Q15min safety checks. Assault precautions.  "

## 2024-04-28 NOTE — ASSESSMENT & PLAN NOTE
At this time we will go ahead and look towards institutional trials of SSRI and we will look towards strategies to try to promote stabilization in his mood.  Patient was appears to have a comorbid history which he was suffers from both mood disturbance complicated by anxious component that culminated some conversion.  He also has a comorbid history of substance use problems with alcohol and cannabis.  We will need to work on all 3 areas to ensure stabilization.  We will look towards SSRI versus atypical antidepressant.  Also we will need to look to see what we can utilized to help mitigate difficulties in terms of anxiety that culminated in the full-blown panic.

## 2024-04-28 NOTE — NURSING
"Daily Nursing Note:      Behavior:    Patient (Luis Salcido is a 24 y.o. male, : 2000, MRN: [id number]) demonstrating an affect that was sad and anxious. Luis demonstrating mood that is anxious. Luis had an appearance that was clean. Luis denies suicidal ideation. Luis denies suicide plan. Luis denies homicidal ideation. Luis denies hallucinations.    Luis's  height is 6' 5" (1.956 m) and weight is 79.8 kg (175 lb 14.8 oz). His temperature is 98.4 °F (36.9 °C). His blood pressure is 128/68 and his pulse is 63. His respiration is 16 and oxygen saturation is 99%.       Intervention:    Encourage Luis to perform self-hygiene, grooming, and changing of clothing. Monitor Luis's behavior and program compliance. Monitor Luis for suicidal ideation, homicidal ideation, sleep disturbance, and hallucinations. Encourage Luis to eat all portions of meals and assess for meal preferences. Monitor Luis for intake and output to ensure hydration. Notify the Physician/Physician Assistant/Advance Practice Registered Nurse (MD/PA/APRN) for any medication refusal and any change in patient condition.      Response:    Luis verbalizes understand of unit process and procedures.     Plan:     Continue to monitor per MD/PA/APRN orders; maintain patient safety.  "

## 2024-04-28 NOTE — ASSESSMENT & PLAN NOTE
Appropriate risk assessments for those that are the targeting his aggression will be looked at.  Additionally will ensure that those targets of his aggression are notified of his intent to harm him.  We will look towards strategies to facilitate improved stabilization of his mood.

## 2024-04-28 NOTE — GROUP NOTE
Group Psychotherapy       Group Focus: Spirituality and Well-Being      Number of patients in attendance: 3    Group Start Time: 1300  Group End Time:  1345  Groups Date: 4/28/2024  Group Topic:  Behavioral Health  Group Department: [facility]  Group Facilitators:  Gladys James RN  _____________________________________________________________________    Patient Name: Luis Salcido  MRN: [id number]  Patient Class: IP- Psych   Admission Date\Time: 4/27/2024  9:25 AM  Hospital Length of Stay: 1  Primary Care Provider: Karolina Primary Doctor     Referred by: Behavioral Medicine Unit Treatment Team     Target symptoms: Poor Coping Skills     Patient's response to treatment: Active Listening     Progress toward goals: Progressing adequately     Interval History: Attended and participated in group     Diagnosis: Aggressive behavior     Plan: Continue treatment on BMU
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
## 2024-04-29 PROCEDURE — 11400000 HC PSYCH PRIVATE ROOM

## 2024-04-29 PROCEDURE — 25000003 PHARM REV CODE 250: Performed by: PSYCHIATRY & NEUROLOGY

## 2024-04-29 RX ORDER — IBUPROFEN 400 MG/1
400 TABLET ORAL EVERY 6 HOURS PRN
Status: DISCONTINUED | OUTPATIENT
Start: 2024-04-29 | End: 2024-05-03 | Stop reason: HOSPADM

## 2024-04-29 RX ORDER — LORAZEPAM 0.5 MG/1
0.5 TABLET ORAL 4 TIMES DAILY
Status: DISCONTINUED | OUTPATIENT
Start: 2024-04-29 | End: 2024-04-30

## 2024-04-29 RX ORDER — CLONIDINE HYDROCHLORIDE 0.1 MG/1
0.1 TABLET ORAL 3 TIMES DAILY
Status: DISCONTINUED | OUTPATIENT
Start: 2024-04-29 | End: 2024-04-30

## 2024-04-29 RX ADMIN — LORAZEPAM 0.5 MG: 0.5 TABLET ORAL at 01:04

## 2024-04-29 RX ADMIN — LORAZEPAM 0.5 MG: 0.5 TABLET ORAL at 08:04

## 2024-04-29 RX ADMIN — CLONIDINE HYDROCHLORIDE 0.1 MG: 0.1 TABLET ORAL at 08:04

## 2024-04-29 RX ADMIN — CLONIDINE HYDROCHLORIDE 0.1 MG: 0.1 TABLET ORAL at 03:04

## 2024-04-29 RX ADMIN — LORAZEPAM 0.5 MG: 0.5 TABLET ORAL at 05:04

## 2024-04-29 RX ADMIN — ESCITALOPRAM OXALATE 10 MG: 10 TABLET ORAL at 08:04

## 2024-04-29 NOTE — PROGRESS NOTES
Hospital day 2.     24-year-old  male who at this time was seen on telemedicine platform those both voice and video synchronized.  Patient was in [facility].  Provider is in [facility].      Patient was this time reports he did sleep well last night.  He states he was little bit fatigued and tired with current detoxification measures.  Recommendations has been made for institutional trials of Lexapro.  Patient was recommended for trials of Lexapro because overall history of which he reports ever present anxiety, frequently culminating in panic as well as conversion with fit.  Patient this time shows little remorse for his actions with his grandmother.    Attempted to confirm his overall alcohol use issues.  He was continues to minimized overall impact it is having on his life and functioning.  He continues to say she was away to promote his creativity.  Confronted the reality of this he was should not be required to come intoxicated feel more creative.    Patient was at least willing to look at some of these issues.      On mental status examination:  24-year-old  male whose affect at this time is blunted.  Whose speech was with good articulation and execution.  His thought processes this time were not readily produced but to be tracked.  His thought content demonstrated no clear evidence of any auditory or visual hallucinations.  Questioned whether he has any immediate thoughts to harm others or harm self he minimized his.  He continues to have preoccupation with current life stresses difficulties in his personal life surrounding expectant child as well as his previous relationships.  His insight and judgment deemed to be limited.  Orientation was intact.      Impression:  1.  Major depressive disorder recurrent moderate degree without psychotic features  Suicide ideation/homicidal ideations   3. Alcohol use disorder   4. Cannabis use disorder   Five.  Nonadherence      Estimated continued hospitalization 96 hours     Indications:  Patient was time has not achieved adequate detoxification from alcohol need ongoing management at this time to manage mood with the appropriate antidepressants and coordination of services outpatient care     Recommendations:  1. Continue with the detoxification measures but we will cut both Catapres as well as Ativan today.    2.  I have institute patient was trials of Lexapro 10 mg p.o. q.day. anticipate those doses will be advanced forward   3. We will request a  work to engage grandmother as well as mother and overall treatment individuals.  We will need to establish where patient was be returning to live.  4. We will need to educate patient was about the importance of engaging into a sober lifestyle abstaining from alcohol.
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
## 2024-04-29 NOTE — NURSING
"Daily Nursing Note:      Behavior:    Patient (Luis Salcido is a 24 y.o. male, : 2000, MRN: [id number]) demonstrating an affect that was anxious. Luis demonstrating mood that is anxious. Luis had an appearance that was clean. Luis denies suicidal ideation. Luis denies suicide plan. Luis denies homicidal ideation. Luis denies hallucinations.    Luis's  height is 6' 5" (1.956 m) and weight is 80.8 kg (178 lb 2.1 oz). His temperature is 98 °F (36.7 °C). His blood pressure is 128/70 and his pulse is 59 (abnormal). His respiration is 18 and oxygen saturation is 99%.     Luis's last BM was noted on:       Intervention:    Encourage Luis to perform self-hygiene, grooming, and changing of clothing. Monitor Luis's behavior and program compliance. Monitor Luis for suicidal ideation, homicidal ideation, sleep disturbance, and hallucinations. Encourage Luis to eat all portions of meals and assess for meal preferences. Monitor Luis for intake and output to ensure hydration. Notify the Physician for any medication refusal and any change in patient condition.      Response:    Luis verbalizes understand of unit process and procedures. He is compliant with medications, no adverse effects observed or reported. He denies detox symptoms. He reports that "he wants to move forward and get things better with family"      Plan:     Continue to monitor per MD/PA/APRN orders; maintain patient safety.   "

## 2024-04-29 NOTE — NURSING
"Daily Nursing Note:      Behavior:    Patient (Luis Salcido is a 24 y.o. male, : 2000, MRN: [id number]) demonstrating an affect that was anxious. Luis demonstrating mood that is anxious. Luis had an appearance that was clean. Luis denies suicidal ideation. Luis denies suicide plan. Luis denies homicidal ideation. Luis denies hallucinations.    Luis's  height is 6' 5" (1.956 m) and weight is 80.8 kg (178 lb 2.1 oz). His temperature is 98.4 °F (36.9 °C). His blood pressure is 123/82 and his pulse is 64. His respiration is 16 and oxygen saturation is 99%.       Intervention:    Encourage Luis to perform self-hygiene, grooming, and changing of clothing. Monitor Luis's behavior and program compliance. Monitor Luis for suicidal ideation, homicidal ideation, sleep disturbance, and hallucinations. Encourage Luis to eat all portions of meals and assess for meal preferences. Monitor Luis for intake and output to ensure hydration. Notify the Physician/Physician Assistant/Advance Practice Registered Nurse (MD/PA/APRN) for any medication refusal and any change in patient condition.      Response:    Luis verbalizes understand of unit process and procedures.     Plan:     Continue to monitor per MD/PA/APRN orders; maintain patient safety.  "

## 2024-04-29 NOTE — PROGRESS NOTES
Recreation Therapy Assessment    1. MOOD: anxious ,depressed .  Patient states he had a lot of stress in his life lately been working a lot of hours and overwhelmed.    2. BEHAVIOR:cooperative in interview    3. AFFECT:anxious and depressed     4. COGNITION: alert     5. MOTOR BEHAVIOR/SKILLS: ambulatory    6. SPEECH:normal    7. LEISURE FUNCTIONING: sports,music,football ,basketball and work out.     8. LEISURE NEEDS: to regain positive leisure interests in daily life that non substance related.     9. PT EDUCATION LEVEL: 12 th grade    10. WHAT IS THE BEST THING THAT EVER HAPPENED TO YOU? My children. 3 year old and one on the way. Pt states    11. THINGS THAT FRUSTRATE AND ANGER ME ARE: getting upset and angry a lot. I had been working a lot of hours. Pt states.     12. WHEN I GET ANGRY, I USUALLY: fuss, argue and fight.     13. MY RELATIONSHIP WITH MOST PEOPLE ARE: not so good lately.     14. LEISURE INTERESTS/SKILLS: music, sports, working out and games.     15. LEISURE BARRIERS: drinking a lot ,getting angry and mad.     16. ASSESSMENT SUMMARY:  Patient is a 24-year-old black male .  Patient lives with his mom and his grandmother.  Patient has a 3-year-old and 1 baby on the way.  Patient has a job at [employer] when he went to work intoxicated.  Patient stated that he had been working a lot hours and was very stressed out and anxious.  Patient states he likes to smoke weed.  Patient has been getting arguments with his mom and grandmother.  Patient has been threat in his grandmother.  Patient has 2 siblings.      17. TX PLAN FOR RECREATION THERAPY:  Patient will receive recreational therapy services 2 times a day and 5 times a week with Eva caruso .  Patient will be assisted to complete 3-4 assignments on appropriate problem-solving skills and emotional outlets to enhance coping skills in daily life.  Patient will be assisted to complete 3-4 assignments on non substance leisure skills  and interests to  enhance appropriate social skills, leisure skills, problem-solving skills, emotional outlets, and self-worth.  Patient will use art music cognitive games and exercise to achieve these goals.  Assist patient one-to-one as needed to be successful in complete his goals to the best of his ability.  All to enhance quality of life at home when discharged.       18. SIGNATURE/CREDENTIALS:   Eva Marcus ma ctrs                                                                DATE:   04/29/2024                           TIME:  7:42 a.m.        RECREATION THERAPIST  JESSE

## 2024-04-29 NOTE — GROUP NOTE
Group Psychotherapy       Group Focus: Life Skills      Number of patients in attendance: 4    Group Start Time: 0900  Group End Time:  0945  Groups Date: 4/29/2024  Group Topic:  Behavioral Health  Group Department: [facility]  Group Facilitators:  Lan Bergman LCSW  _____________________________________________________________________    Patient Name: Luis Salcido  MRN: [id number]  Patient Class: IP- Psych   Admission Date\Time: 4/27/2024  9:25 AM  Hospital Length of Stay: 2  Primary Care Provider: Karolina Primary Doctor     Referred by: Behavioral Medicine Unit Treatment Team     Target symptoms: Substance Abuse     Patient's response to treatment: Active Listening and Self-disclosure     Progress toward goals: Progressing slowly     Interval History: Group discussed self care and working towards goals rather than focusing on past mistakes.     Diagnosis:      Plan: Continue treatment on BMU

## 2024-04-29 NOTE — PROGRESS NOTES
Recreation Therapy Progress Note    Date: 4 29 2024    Time: 12:30  pm  group     Group Title: creative expression    Mood: depressed and low energy    Behavior: attended group with motivation needed  from rt staff    Affect: depressed and ,quiet and low energy as well as withdrawn . Pt art work showed high depression    Speech: quiet    Cognition: alert on assignments needed prompts at intervals to stay focus on task.     Participation Level: 100% in art and exercise class.    Intervention:cont rt services.          Recreation Therapist   Signature: Gabrielle AGUDELOS

## 2024-04-29 NOTE — PLAN OF CARE
Problem: Adult Behavioral Health Plan of Care  Goal: Plan of Care Review  Outcome: Progressing  Goal: Optimized Coping Skills in Response to Life Stressors  Outcome: Progressing  Intervention: Promote Effective Coping Strategies  Flowsheets (Taken 4/29/2024 1552)  Supportive Measures:   active listening utilized   decision-making supported   goal-setting facilitated   positive reinforcement provided   self-care encouraged   verbalization of feelings encouraged     Problem: Depression  Goal: Improved Mood  Outcome: Progressing  Intervention: Monitor and Manage Depressive Symptoms  Flowsheets (Taken 4/29/2024 1552)  Supportive Measures:   active listening utilized   decision-making supported   goal-setting facilitated   positive reinforcement provided   self-care encouraged   verbalization of feelings encouraged  Family/Support System Care: self-care encouraged     Problem: Excessive Substance Use  Goal: Improved Behavioral Control (Excessive Substance Use)  Outcome: Progressing  Intervention: Promote Behavior and Impulse Control  Flowsheets (Taken 4/29/2024 1552)  Behavior Management: impulse control promoted  Goal: Increased Participation and Engagement (Excessive Substance Use)  Outcome: Progressing

## 2024-04-29 NOTE — GROUP NOTE
Nursing Group       Group Focus: Symptoms Management      Number of patients in attendance: 3    Group Start Time: 1600  Group End Time:  1645  Groups Date: 4/29/2024  Group Topic:  Behavioral Health  Group Department: [facility]  Group Facilitators:  Cecy Goodwin RN  _____________________________________________________________________    Patient Name: Luis Salcido  MRN: [id number]  Patient Class: IP- Psych   Admission Date\Time: 4/27/2024  9:25 AM  Hospital Length of Stay: 2  Primary Care Provider: Karolina, Primary Doctor     Referred by: Behavioral Medicine Unit Treatment Team     Target symptoms: NA     Patient's response to treatment: NA     Progress toward goals: NA     Interval History: Pt did not attend     Diagnosis: MDD     Plan: Continue treatment on BMU

## 2024-04-29 NOTE — PSYCH EVALUATION
Behavioral Health Unit  Psychosocial History and Assessment  Progress Note      Patient Name: Luis Salcido YOB: 2000 SW: Lan Bergman, LCSW Date: 4/29/2024    Chief Complaint: addictive disorder and mood swings    Consent:     Did the patient consent for an interview with the ? Yes    Did the patient consent for the  to contact family/friend/caregiver?   Yes  Relationship: mother, Jovita    Did the patient give consent for the  to inform family/friend/caregiver of his/her whereabouts or to discuss discharge planning? Yes    Source of Information: Face to face with patient, Telephone interview with family/friend/caregiver, and Chart review    Is information obtained from interviews considered reliable?   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       History of Present Illness - (Patient Perception):   Pt in  a familial conflict at home with his grandmother.  Pt had been drinking and according to family pt has an extensive drinking problem and was recently fired for drinking on the job.  Family indicates this has happened several times.  Pt was threatening to the grandmother.  Family reports this behavior has been going for many years and continues to escalate.  Pt minimizes his use.  Pt has never been in rehab.  Pt has had previous hospitalization but did not follow up or remain med compliant.    History of Present Illness - (Perception of Others): ADHD since childhood  according to mother    Present biopsychosocial functioning: low    Past biopsychosocial functioning: Pt has a history of much higher function.    Family and Marital/Relationship History:     Significant Other/Partner Relationships:  Partnered: Pt has one child age 4 from a previous relationship and his jasmeetetn girlfriend is pregnant    Family Relationships: Strained      Childhood History:     Where was patient raised? [place]    Who raised the patient? Mother, grandmother      How does patient describe their childhood? Good, pt was an athlete      Who is patient's primary support person? mother      Culture and Uatsdin:     Uatsdin: Advent    How strong of a role does Yarsanism and spirituality play in patient's life? none    Yazidi or spiritual concerns regarding treatment: not applicable     History of Abuse:   History of Abuse: Denies      Outcome:     Psychiatric and Medical History:     History of psychiatric illness or treatment: prior inpatient treatment    Medical history:   Past Medical History:   Diagnosis Date    Alcohol abuse     Depression     History of psychiatric hospitalization     Hx of psychiatric care     Panic disorder     Psychiatric problem     Suicide attempt     Withdrawal symptoms, alcohol        Substance Abuse History:     Alcohol - (Patient Perspective):   Social History     Substance and Sexual Activity   Alcohol Use Yes       Alcohol - (Collateral Perspective): daily according to mother.  She reports multiple bottles    Drugs - (Patient Perspective):   Social History     Substance and Sexual Activity   Drug Use Yes    Types: Marijuana       Drugs - (Collateral Perspective): THC/pills according to mother    Additional Comments: Pt thinks he needs it for creativity.  No history of rehab    Education:     Currently Enrolled? No  High School (9-12) or GED    Special Education? No    Interested in Completing Education/GED: No    Employment and Financial:     Currently employed?  Unemployed recently fired for being drunk at work    Source of Income:  none currently    Able to afford basic needs (food, shelter, utilities)? No  Pt lives with family    Who manages finances/personal affairs? patient      Service:     ? no    Combat Service? No     Community Resources:     Describe present use of community resources: none     Identify previously used community resources   (Include previous mental health treatment - outpatient and inpatient): pt has had previous hospitalizations    Environmental:     Current living situation:Lives with family    Social Evaluation:     Patient Assets: Average or above intelligence, Supportive family/friends, Motivation for treatment/growth, Capable of independent living, Work skills, and Physical health    Patient Limitations: alcohol/drug use, poor coping skills, treatment non compliant    High risk psychosocial issues that may impact discharge planning:   Risk of relapse    Recommendations:     Anticipated discharge plan:   home    High risk issues requiring early treatment planning and immediate intervention: alcohol use, drug use    Community resources needed for discharge planning:  aftercare treatment sources    Anticipated social work role(s) in treatment and discharge planning:  will offer advice and  as well as group therapy 4x per week and individual as necessary.   will assist with discharge planning and referrals to aftercare resources

## 2024-04-29 NOTE — PROGRESS NOTES
Recreation Therapy Progress Note    Date:  04/29/2024    Time:  10:00 a.m.    Group Title:  Leisure skills    Mood:  Depressed, low energy but participating    Behavior:  Patient needed prompts to participate in group and not go back to bed.    Affect:  Patient quiet low energy and depressed affect    Speech:  Quiet needed prompts to interact in conversation at minimum level    Cognition:  Alert in group    Participation Level:  100% with prompts needed to stay focused on cognitive game to the best of his ability    Intervention:  Continue RT services          Recreation Therapist   Signature:  Eva Marcus MA CTRS

## 2024-04-30 PROCEDURE — 11400000 HC PSYCH PRIVATE ROOM

## 2024-04-30 PROCEDURE — 25000003 PHARM REV CODE 250: Performed by: PSYCHIATRY & NEUROLOGY

## 2024-04-30 RX ORDER — ESCITALOPRAM OXALATE 10 MG/1
20 TABLET ORAL DAILY
Status: DISCONTINUED | OUTPATIENT
Start: 2024-05-01 | End: 2024-05-03 | Stop reason: HOSPADM

## 2024-04-30 RX ORDER — CLONIDINE HYDROCHLORIDE 0.1 MG/1
0.1 TABLET ORAL 2 TIMES DAILY
Status: DISCONTINUED | OUTPATIENT
Start: 2024-05-01 | End: 2024-05-02

## 2024-04-30 RX ORDER — LORAZEPAM 0.5 MG/1
0.5 TABLET ORAL 2 TIMES DAILY
Status: DISCONTINUED | OUTPATIENT
Start: 2024-05-01 | End: 2024-05-02

## 2024-04-30 RX ADMIN — LORAZEPAM 0.5 MG: 0.5 TABLET ORAL at 08:04

## 2024-04-30 RX ADMIN — CLONIDINE HYDROCHLORIDE 0.1 MG: 0.1 TABLET ORAL at 03:04

## 2024-04-30 RX ADMIN — LORAZEPAM 0.5 MG: 0.5 TABLET ORAL at 05:04

## 2024-04-30 RX ADMIN — LORAZEPAM 0.5 MG: 0.5 TABLET ORAL at 01:04

## 2024-04-30 RX ADMIN — ESCITALOPRAM OXALATE 10 MG: 10 TABLET ORAL at 08:04

## 2024-04-30 RX ADMIN — CLONIDINE HYDROCHLORIDE 0.1 MG: 0.1 TABLET ORAL at 08:04

## 2024-04-30 NOTE — GROUP NOTE
Group Psychotherapy       Group Focus: Relationship Dynamics      Number of patients in attendance: 6    Group Start Time: 0900  Group End Time:  0945  Groups Date: 4/30/2024  Group Topic:  Behavioral Health  Group Department: [facility]  Group Facilitators:  Lan Bergman LCSW  _____________________________________________________________________    Patient Name: Luis Salcido  MRN: [id number]  Patient Class: IP- Psych   Admission Date\Time: 4/27/2024  9:25 AM  Hospital Length of Stay: 3  Primary Care Provider: Karolina Primary Doctor     Referred by: Behavioral Medicine Unit Treatment Team     Target symptoms: Substance Abuse and Mood Disorder     Patient's response to treatment: Active Listening and Self-disclosure     Progress toward goals: Progressing slowly     Interval History: Group focused on discussing the elements of successful relationships in our lives to help promote good mental health and higher function.     Diagnosis:      Plan: Continue treatment on BMU

## 2024-04-30 NOTE — NURSING
"Daily Nursing Note:      Behavior:    Patient (Luis Salcido is a 24 y.o. male, : 2000, MRN: [id number]) demonstrating an affect that was anxious. Luis demonstrating mood that is anxious. Luis had an appearance that was clean. Luis denies suicidal ideation. Luis denies suicide plan. Luis denies homicidal ideation. Luis denies hallucinations.    Luis's  height is 6' 5" (1.956 m) and weight is 80.8 kg (178 lb 2.1 oz). His temperature is 98.6 °F (37 °C). His blood pressure is 117/73 and his pulse is 61. His respiration is 16 and oxygen saturation is 98%. Luis's last BM was noted on 2024.    Intervention:  Encouraged Luis to perform self-hygiene, grooming, and changing of clothing. Assessed Luis's behavior and program compliance. Assessed Luis for suicidal ideation, homicidal ideation, sleep disturbance, and hallucinations. Encouraged Luis to eat all portions of meals and assesse for meal preferences. Monitored Luis for intake and output to ensure hydration. Will notify the Physician for any medication refusal and any change in patient condition.      Response:  Luis verbalizes and displays understanding of unit process and procedures. Luis is compliant with medication therapy.  He actively participated in group and interacted well with peers.  He denies thoughts of wanting to hurt himself or anyone else.        Plan:   Continue to monitor per MD orders; maintain patient safety with safety checks Q15 minutes and prn.   "

## 2024-04-30 NOTE — GROUP NOTE
Group Psychotherapy       Group Focus: Promoting Healthy Lifestyles      Number of patients in attendance: 6    Group focused on positive affirmation and self-esteem    Group Start Time: 2000  Group End Time:  2045  Groups Date: 4/29/2024  Group Topic:  Behavioral Health  Group Department: [facility]  Group Facilitators:  Marylin Juarez RN  _____________________________________________________________________    Patient Name: Luis Salcido  MRN: [id number]  Patient Class: IP- Psych   Admission Date\Time: 4/27/2024  9:25 AM  Hospital Length of Stay: 2  Primary Care Provider: Karolina, Primary Doctor     Referred by: Behavioral Medicine Unit Treatment Team     Target symptoms: Depression     Patient's response to treatment: Active Listening, Self-disclosure, and Feedback given to another patient     Progress toward goals: Progressing slowly     Interval History: active participant, eager and talkative      Diagnosis: Major Depression      Plan: Continue treatment on BMU

## 2024-04-30 NOTE — NURSING
"Daily Nursing Note:      Behavior:    Patient (Luis Salcido is a 24 y.o. male, : 2000, MRN: [id number]) demonstrating an affect that was anxious. Luis demonstrating mood that is anxious. Luis had an appearance that was clean. Luis denies suicidal ideation. Luis denies suicide plan. Luis denies homicidal ideation. Luis denies hallucinations.    Luis's  height is 6' 5" (1.956 m) and weight is 80.8 kg (178 lb 2.1 oz). His temperature is 98 °F (36.7 °C). His blood pressure is 120/80 and his pulse is 63. His respiration is 16 and oxygen saturation is 98%.     Luis's last BM was noted on: 24      Intervention:    Encourage Luis to perform self-hygiene, grooming, and changing of clothing. Monitor Luis's behavior and program compliance. Monitor Luis for suicidal ideation, homicidal ideation, sleep disturbance, and hallucinations. Encourage Luis to eat all portions of meals and assess for meal preferences. Monitor Luis for intake and output to ensure hydration. Notify the Physician for any medication refusal and any change in patient condition.      Response:    Luis verbalizes understand of unit process and procedures. Luis is compliant with medications, no adverse effects observed or reported.     Plan:     Continue to monitor per MD orders; maintain patient safety. Q 15 min safety checks. Will monitor mood and behavior and offer emotional support.  "

## 2024-04-30 NOTE — PROGRESS NOTES
Recreation Therapy Progress Note    Date:  04 30 2024    Time:  1400    Group Title:  Leisure skills    Mood:  Depressed and anxious    Behavior:  Participate in group with prompts and motivation    Affect:  Depressed and anxious    Speech:  Patient quiet needs prompts to interact in conversation    Cognition:  Alert and able to focused on assignments    Participation Level:  100% completed with the project of his interests    Intervention:  Continue RT services          Recreation Therapist   Signature:  Eva Marcus MA CTRS

## 2024-04-30 NOTE — PLAN OF CARE
Problem: Adult Behavioral Health Plan of Care  Goal: Plan of Care Review  Outcome: Progressing  Goal: Patient-Specific Goal (Individualization)  Outcome: Progressing  Goal: Adheres to Safety Considerations for Self and Others  Outcome: Progressing  Intervention: Develop and Maintain Individualized Safety Plan  Flowsheets (Taken 4/29/2024 1935)  Safety Measures: suicide assessment completed  Goal: Absence of New-Onset Illness or Injury  Outcome: Progressing  Intervention: Prevent Infection  Flowsheets (Taken 4/29/2024 1935)  Infection Prevention: hand hygiene promoted  Goal: Optimized Coping Skills in Response to Life Stressors  Outcome: Progressing  Intervention: Promote Effective Coping Strategies  Flowsheets (Taken 4/29/2024 1935)  Supportive Measures:   active listening utilized   self-care encouraged   verbalization of feelings encouraged  Goal: Develops/Participates in Therapeutic [place] to Support Successful Transition  Outcome: Progressing  Goal: Rounds/Family Conference  Outcome: Progressing     Problem: Depression  Goal: Improved Mood  Outcome: Progressing  Intervention: Monitor and Manage Depressive Symptoms  Flowsheets (Taken 4/29/2024 1935)  Supportive Measures:   active listening utilized   self-care encouraged   verbalization of feelings encouraged     Problem: Excessive Substance Use  Goal: Improved Behavioral Control (Excessive Substance Use)  Outcome: Progressing  Intervention: Promote Behavior and Impulse Control  Flowsheets (Taken 4/29/2024 1935)  Behavior Management:   impulse control promoted   boundaries reinforced  Goal: Increased Participation and Engagement (Excessive Substance Use)  Outcome: Progressing  Intervention: Facilitate Participation and Engagement  Flowsheets (Taken 4/29/2024 1935)  Supportive Measures:   active listening utilized   self-care encouraged   verbalization of feelings encouraged

## 2024-04-30 NOTE — PLAN OF CARE
Problem: Adult Behavioral Health Plan of Care  Goal: Plan of Care Review  Outcome: Progressing     Problem: Excessive Substance Use  Goal: Improved Behavioral Control (Excessive Substance Use)  Outcome: Progressing  Intervention: Promote Behavior and Impulse Control  Flowsheets (Taken 4/30/2024 1816)  Behavior Management: impulse control promoted  Goal: Increased Participation and Engagement (Excessive Substance Use)  Outcome: Progressing  Intervention: Facilitate Participation and Engagement  Flowsheets (Taken 4/30/2024 1816)  Supportive Measures:   active listening utilized   decision-making supported   goal-setting facilitated   positive reinforcement provided   verbalization of feelings encouraged     Problem: Depression  Goal: Improved Mood  Outcome: Progressing  Intervention: Monitor and Manage Depressive Symptoms  Flowsheets (Taken 4/30/2024 1816)  Supportive Measures:   active listening utilized   decision-making supported   goal-setting facilitated   positive reinforcement provided   verbalization of feelings encouraged  Family/Support System Care: self-care encouraged     Problem: Excessive Substance Use  Goal: Improved Behavioral Control (Excessive Substance Use)  Outcome: Progressing  Intervention: Promote Behavior and Impulse Control  Flowsheets (Taken 4/30/2024 1816)  Behavior Management: impulse control promoted  Goal: Increased Participation and Engagement (Excessive Substance Use)  Outcome: Progressing  Intervention: Facilitate Participation and Engagement  Flowsheets (Taken 4/30/2024 1816)  Supportive Measures:   active listening utilized   decision-making supported   goal-setting facilitated   positive reinforcement provided   verbalization of feelings encouraged

## 2024-04-30 NOTE — PROGRESS NOTES
Recreation Therapy Progress Note    Date:  04/30/2024    Time:  10:00 a.m.    Group Title:  Creative expression    Mood:  Patient anxious and depressed    Behavior:  Patient participate in RT art and music class.    Affect:  Patient anxious and depressed    Speech:  Patient quiet needs prompts to interact in conversation    Cognition:  Patient alert and able to focused on what project of his interests needed one-to-one assistance at intervals to make good choices on problem-solving activity    Participation Level:  Patient stayed in group 100% patient art work did show a lot of depression and anger    Intervention:  Continue RT services          Recreation Therapist   Signature:  Eva Marcus MA CTRS   Cardiology

## 2024-05-01 PROCEDURE — 25000003 PHARM REV CODE 250: Performed by: PSYCHIATRY & NEUROLOGY

## 2024-05-01 PROCEDURE — 11400000 HC PSYCH PRIVATE ROOM

## 2024-05-01 RX ORDER — BUSPIRONE HYDROCHLORIDE 5 MG/1
5 TABLET ORAL 2 TIMES DAILY
Status: DISCONTINUED | OUTPATIENT
Start: 2024-05-01 | End: 2024-05-02

## 2024-05-01 RX ORDER — ACAMPROSATE CALCIUM 333 MG/1
333 TABLET, DELAYED RELEASE ORAL 3 TIMES DAILY
Status: DISCONTINUED | OUTPATIENT
Start: 2024-05-01 | End: 2024-05-03 | Stop reason: HOSPADM

## 2024-05-01 RX ADMIN — ACAMPROSATE CALCIUM 333 MG: 333 TABLET, DELAYED RELEASE ORAL at 09:05

## 2024-05-01 RX ADMIN — LORAZEPAM 0.5 MG: 0.5 TABLET ORAL at 08:05

## 2024-05-01 RX ADMIN — BUSPIRONE HYDROCHLORIDE 5 MG: 5 TABLET ORAL at 09:05

## 2024-05-01 RX ADMIN — ESCITALOPRAM OXALATE 20 MG: 10 TABLET ORAL at 08:05

## 2024-05-01 RX ADMIN — CLONIDINE HYDROCHLORIDE 0.1 MG: 0.1 TABLET ORAL at 08:05

## 2024-05-01 RX ADMIN — ACAMPROSATE CALCIUM 333 MG: 333 TABLET, DELAYED RELEASE ORAL at 02:05

## 2024-05-01 RX ADMIN — ACAMPROSATE CALCIUM 333 MG: 333 TABLET, DELAYED RELEASE ORAL at 08:05

## 2024-05-01 RX ADMIN — BUSPIRONE HYDROCHLORIDE 5 MG: 5 TABLET ORAL at 08:05

## 2024-05-01 NOTE — NURSING
"Daily Nursing Note:      Behavior:    Patient (Luis Salcido is a 24 y.o. male, : 2000, MRN: [id number]) demonstrating an affect that was anxious. Luis demonstrating mood that is anxious and pleasant and appropriate. Luis had an appearance that was clean. Luis denies suicidal ideation. Luis denies suicide plan. Luis denies homicidal ideation. Luis denies hallucinations.    Luis's  height is 6' 5" (1.956 m) and weight is 80.8 kg (178 lb 2.1 oz). His temperature is 98.2 °F (36.8 °C). His blood pressure is 146/89 (abnormal) and his pulse is 75. His respiration is 16 and oxygen saturation is 98%.     Luis's last BM was noted on: 2024.      Intervention:    Encourage Luis to perform self-hygiene, grooming, and changing of clothing. Monitor Luis's behavior and program compliance. Monitor Luis for suicidal ideation, homicidal ideation, sleep disturbance, and hallucinations. Encourage Luis to eat all portions of meals and assess for meal preferences.  Ensure hydration by offering fluids. Notify the Physician for any medication refusal and any change in patient condition.      Response:    Luis verbalizes understand of unit process and procedures. Luis is compliant with medications and treatment.    Plan:     Continue to monitor per MD orders; maintain patient safety. Q15min safety checks.  "

## 2024-05-01 NOTE — NURSING
"Daily Nursing Note:      Behavior:    Patient (Luis Salcido is a 24 y.o. male, : 2000, MRN: [id number]) demonstrating an affect that was expansive. Luis demonstrating mood that is pleasant and appropriate. Luis had an appearance that was clean. Luis denies suicidal ideation. Luis denies suicide plan. Luis denies homicidal ideation. Luis denies hallucinations.    Luis's  height is 6' 5" (1.956 m) and weight is 80.8 kg (178 lb 2.1 oz). His temperature is 98.2 °F (36.8 °C). His blood pressure is 146/89 (abnormal) and his pulse is 75. His respiration is 16 and oxygen saturation is 98%.       Intervention:    Encouraged Luis to perform self-hygiene, grooming, and changing of clothing. Monitored Luis's behavior and program compliance. Monitored Luis for suicidal ideation, homicidal ideation, sleep disturbance, and hallucinations. Encouraged Luis to eat all portions of meals and assesse for meal preferences. Monitored Luis for intake and output to ensure hydration. Will notify the Physician for any medication refusal and any change in patient condition.      Response:    Luis verbalizes understanding of unit process and procedures. Luis reported no medication issues.    Plan:   Continue to monitor per MD orders; maintain patient safety with safety checks Q15 minutes and prn.   "

## 2024-05-01 NOTE — PROGRESS NOTES
Hospital day 4.    Full treatment team was held today.  Patient was examined today.  Full chart reviewed today.  Presentation treatment team were , activity therapist, psychiatric nursing, psychiatrist:  Case management.      Full treatment team was held today.  Patient was.  We will chart is reviewed today.  Interval history shows this is a 24-year-old  male who has a history of which he made threats of aggression towards grandmother.  Has a history of which he made such threats and has ongoing pattern which he has been using alcohol.  Patient was a result has struggled in terms of his mood in his poor impulse control.      Patient was this time has been started on trials of SSRI.  Along with this he was also been placed on a benzodiazepine and Catapres taper.  For the most part he was tolerated well without any explosive behavior.  Questions to be whether we could maintain with the Catapres as to help decrease impulse and mood instability.    In a multidisciplinary approach team did review overall problem list was continued include the followin. To harm others now resolved  2.  Marked mood instability   3. Abuse of psychoactive chemicals particularly alcohol   4. Prior nonadherence to medications and treatment.      Patient was been approached by the possibility going forward to residential level of care but he was not been willing to comply with the recommendations.  Strong recommendations thereafter before comply with a IOP level of care post discharge.  Patient this time appears to be high-risk with the alcohol use disorder.      Mental status examination:  24-year-old  male who at this time appears to be a little bit overly confident.  Whose speech is with good articulation and execution.  His thought processes this time were when produced were linear.  His thought content demonstrated no clear evidence of auditory or visual hallucinations.  He made no active  statements to harm self.  He made no active statements to harm others.  He did not show delusions or distortions in his overall reality testing at this time.  When questioned about intention to harm others he adamantly denies.  His insight and judgment deemed to be limited.  Limited with the comes in his substance use disorder.      Impression:   Homicidal ideation resolved   Major depressive disorder recurrent moderate to severe without psychotic features   Alcohol use disorder     Estimated continued hospitalization 48 hours     Indications:  Patient was time need completion of detoxification stabilization of both mood and anxiety before transitioned to a lower level of care     Recommendations:  1.  We will continue with downward titration of doses of Ativan look towards elimination those doses in detoxification  2. We will consider using Catapres to help mitigate difficulties in terms of impulse and anxiety.  Hopefully this will be of some use.  The may also help facilitate ongoing sleep.    3. Continue with trials of Lexapro 20 mg p.o. q.a.m..    4. Need to request a  explore potential for referral patient was into residential level of care versus [facility] level of care outpatient.

## 2024-05-01 NOTE — PROGRESS NOTES
Hospital day 3.     24-year-old  male who at this time is interviewed today.  Patient was seen in the office he reports that this time doing a little bit.  He states he was feeling as if he was not having significant difficulty with detoxification process.  He does for the most part.  Relative comfortable during the interview process.  He at this time expresses how he knows he has been disappointed to his family members cut his overall poor choices and actions.    Patient was verbalize no significant degrees of concerns regarding overall trials of medications for did discuss with the possible utilization trials of BuSpar to help mitigate difficulties in terms of what has been anxious component to his illness.  He was educated the do that we will be making increases in doses of his Lexapro at this time hopes of achieving sullen reductions overall mood and impulse.      Overall patient does appear to be showing benefit and appears to be tolerating current detoxification well.    On mental status examination:  24-year-old  male who cooperates this time.  Whose speech was with good articulation and execution.  His thought processes this time were essentially able to be tracked and consider linear.  His thought content demonstrated ruminations preoccupation with past events.  He made no statements to harm self.  He made no statements this time to harm others.  When questioned directly about overall thoughts to harm members of the family he minimized his.  He does not show any paranoia or delusions.  His insight and judgment deemed to be limited.      Impression:  Homicidal ideation resolving   Suicide ideation resolving   Major depressive disorder recurrent moderate to severe without psychotic features alcohol use disorder    Estimated continued hospitalization 72 hours   Indications:  Patient was time need of completion of detoxification action inappropriately anxiolytic to address issues  of anxious component of presentation as well as maximize overall benefits of medication trials     Recommendations:  1. Increase doses of Lexapro to 20 mg p.o. q.a.m.  2. We will cut doses of Catapres 0.1 mg p.o. b.i.d..  3. We will cut trials of Ativan 0.5 mg p.o. b.i.d..  4.  We will request that  has been individuals with family members as patient was has threatened violence against him and certainly having core family session will be terribly important to occurred prior to discharge   5. We will need to discuss with the patient was possible utilization of agents such as Campral to help mitigate difficulties in terms of craving for alcohol products.  Patient was need to be encouraged to go forward to outpatient level of care to address chronic substance use problems in his mood.  Consideration for referral to IOP level of care she will be on the table.

## 2024-05-01 NOTE — PROGRESS NOTES
Recreation Therapy Progress Note    Date:  5 1 2024    Time:  1400    Group Title:  Leisure skills    Mood:  Less anxious and less depressed this afternoon    Behavior:  Patient improvement in social skills    Affect:  Less anxious and less depressed today    Speech:  Patient talking more in group with staff and peers    Cognition:  Patient able to focused better on cognitive activity    Participation Level:  100% in group    Intervention:  Continue RT services          Recreation Therapist   Signature:  Eva Marcus ma ctrs

## 2024-05-01 NOTE — PLAN OF CARE
Problem: Adult Behavioral Health Plan of Care  Goal: Plan of Care Review  Outcome: Progressing  Goal: Patient-Specific Goal (Individualization)  Outcome: Progressing  Goal: Adheres to Safety Considerations for Self and Others  Outcome: Progressing  Goal: Absence of New-Onset Illness or Injury  Outcome: Progressing  Goal: Optimized Coping Skills in Response to Life Stressors  Outcome: Progressing  Goal: Develops/Participates in Therapeutic [place] to Support Successful Transition  Outcome: Progressing  Goal: Rounds/Family Conference  Outcome: Progressing     Problem: Depression  Goal: Improved Mood  Outcome: Progressing     Problem: Excessive Substance Use  Goal: Improved Behavioral Control (Excessive Substance Use)  Outcome: Progressing  Goal: Increased Participation and Engagement (Excessive Substance Use)  Outcome: Progressing

## 2024-05-01 NOTE — PROGRESS NOTES
Recreation Therapy Progress Note    Date:  05/01/2024    Time:  10:00 a.m.    Group Title:  Creative expression    Mood:  Less anxious less depressed    Behavior:  Patient is focused in participating well in art and music class    Affect:  Patient less anxious and less depressed    Speech:  Patient is interacting and talking more in group    Cognition:  Patient able to focused better today on cognitive activity and problem-solving active    Participation Level:  100% patient is proud of the work he completed today on wood work projects enhancing problem-solving skills and emotional outlets    Intervention:  Continue RT services          Recreation Therapist   Signature:  Eva Marcus MA CTRS

## 2024-05-02 PROCEDURE — 25000003 PHARM REV CODE 250: Performed by: PSYCHIATRY & NEUROLOGY

## 2024-05-02 PROCEDURE — 11400000 HC PSYCH PRIVATE ROOM

## 2024-05-02 RX ORDER — ACAMPROSATE CALCIUM 333 MG/1
333 TABLET, DELAYED RELEASE ORAL 3 TIMES DAILY
Qty: 90 TABLET | Refills: 1 | Status: SHIPPED | OUTPATIENT
Start: 2024-05-02 | End: 2024-07-01

## 2024-05-02 RX ORDER — ESCITALOPRAM OXALATE 20 MG/1
20 TABLET ORAL DAILY
Qty: 30 TABLET | Refills: 1 | Status: SHIPPED | OUTPATIENT
Start: 2024-05-03 | End: 2024-07-02

## 2024-05-02 RX ORDER — CLONIDINE HYDROCHLORIDE 0.1 MG/1
0.1 TABLET ORAL NIGHTLY
Status: DISCONTINUED | OUTPATIENT
Start: 2024-05-02 | End: 2024-05-03 | Stop reason: HOSPADM

## 2024-05-02 RX ORDER — BUSPIRONE HYDROCHLORIDE 5 MG/1
5 TABLET ORAL 3 TIMES DAILY
Status: DISCONTINUED | OUTPATIENT
Start: 2024-05-02 | End: 2024-05-03 | Stop reason: HOSPADM

## 2024-05-02 RX ORDER — BUSPIRONE HYDROCHLORIDE 5 MG/1
5 TABLET ORAL 3 TIMES DAILY
Qty: 90 TABLET | Refills: 1 | Status: SHIPPED | OUTPATIENT
Start: 2024-05-02 | End: 2024-07-01

## 2024-05-02 RX ORDER — LORAZEPAM 0.5 MG/1
0.5 TABLET ORAL NIGHTLY
Status: DISCONTINUED | OUTPATIENT
Start: 2024-05-02 | End: 2024-05-03 | Stop reason: HOSPADM

## 2024-05-02 RX ORDER — CLONIDINE HYDROCHLORIDE 0.1 MG/1
0.1 TABLET ORAL NIGHTLY
Qty: 30 TABLET | Refills: 1 | Status: SHIPPED | OUTPATIENT
Start: 2024-05-02 | End: 2024-07-01

## 2024-05-02 RX ADMIN — CLONIDINE HYDROCHLORIDE 0.1 MG: 0.1 TABLET ORAL at 08:05

## 2024-05-02 RX ADMIN — BUSPIRONE HYDROCHLORIDE 5 MG: 5 TABLET ORAL at 08:05

## 2024-05-02 RX ADMIN — ESCITALOPRAM OXALATE 20 MG: 10 TABLET ORAL at 08:05

## 2024-05-02 RX ADMIN — ACAMPROSATE CALCIUM 333 MG: 333 TABLET, DELAYED RELEASE ORAL at 08:05

## 2024-05-02 RX ADMIN — ACAMPROSATE CALCIUM 333 MG: 333 TABLET, DELAYED RELEASE ORAL at 03:05

## 2024-05-02 RX ADMIN — BUSPIRONE HYDROCHLORIDE 5 MG: 5 TABLET ORAL at 03:05

## 2024-05-02 RX ADMIN — LORAZEPAM 0.5 MG: 0.5 TABLET ORAL at 08:05

## 2024-05-02 NOTE — NURSING
"Daily Nursing Note:      Behavior:    Patient (Luis Salcido is a 24 y.o. male, : 2000, MRN: [id number]) demonstrating an affect that was anxious. Luis demonstrating mood that is anxious and pleasant and appropriate. Luis had an appearance that was clean. Luis denies suicidal ideation. Luis denies suicide plan. Luis denies homicidal ideation. Luis denies hallucinations.    uLis's  height is 6' 5" (1.956 m) and weight is 80.8 kg (178 lb 2.1 oz). His temperature is 98.7 °F (37.1 °C). His blood pressure is 123/82 and his pulse is 59 (abnormal). His respiration is 16 and oxygen saturation is 98%.     Luis's last BM was noted on: _24______      Intervention:    Encourage Luis to perform self-hygiene, grooming, and changing of clothing. Monitor Luis's behavior and program compliance. Monitor Luis for suicidal ideation, homicidal ideation, sleep disturbance, and hallucinations. Encourage Luis to eat all portions of meals and assess for meal preferences. Monitor Luis for intake and output to ensure hydration. Notify the Physician/Physician Assistant/Advance Practice Registered Nurse (MD/PA/APRN) for any medication refusal and any change in patient condition.      Response:    Luis verbalizes understand of unit process and procedures. Luis is compliant with meds.      Plan:     Continue to monitor per MD/PA/APRN orders; maintain patient safety.  "

## 2024-05-02 NOTE — PROGRESS NOTES
Recreation Therapy Progress Note    Date:  05/02/2024    Time:  1400    Group Title:  Leisure skills    Mood:  Less depressed less anxious and interacting more    Behavior:  Patient participate more in group interacting more with staff and peers    Affect:  Patient is less anxious less depressed and less irritable    Speech:  Patient interacting verbally more with staff and peers in conversation in group assignments    Cognition:  Patient able to focused better in cognitive activity and problem-solving projects    Participation Level:  100% in group    Intervention:  Continue RT services          Recreation Therapist   Signature:  Eva Marcus MA CTRS

## 2024-05-02 NOTE — NURSING
"Daily Nursing Note:      Behavior:    Patient (Luis Salcido is a 24 y.o. male, : 2000, MRN: [id number]) demonstrating an affect that was anxious. Luis demonstrating mood that is anxious. Luis had an appearance that was clean. Luis denies suicidal ideation. Luis denies suicide plan. Luis denies homicidal ideation. Luis denies hallucinations.    Luis's  height is 6' 5" (1.956 m) and weight is 80.8 kg (178 lb 2.1 oz). His temperature is 98.6 °F (37 °C). His blood pressure is 135/79 and his pulse is 60. His respiration is 20 and oxygen saturation is 99%.     Luis's last BM was noted on:2024      Intervention:    Encourage Luis to perform self-hygiene, grooming, and changing of clothing. Monitor Luis's behavior and program compliance. Monitor Luis for suicidal ideation, homicidal ideation, sleep disturbance, and hallucinations. Encourage Luis to eat all portions of meals and assess for meal preferences.  Ensure hydration by offering fluids. Notify the Physician for any medication refusal and any change in patient condition.      Response:    Luis verbalizes understand of unit process and procedures. Luis is compliant with medications and treatment.    Plan:     Continue to monitor per MD orders; maintain patient safety. Q15min safety checks.  "

## 2024-05-02 NOTE — GROUP NOTE
Education Group      Group Focus: Offered group on discharge planning.       Number of patients in attendance: 7    Group Start Time: 1130  Group End Time:  1215  Groups Date: 5/2/2024  Group Topic:  Behavioral Health  Group Department: [facility]  Group Facilitators:  Fransisca Yu LPN  _____________________________________________________________________    Patient Name: Luis Salcido  MRN: [id number]  Patient Class: IP- Psych   Admission Date\Time: 4/27/2024  9:25 AM  Hospital Length of Stay: 5  Primary Care Provider: Karolina, Primary Doctor     Referred by: Behavioral Medicine Unit Treatment Team     Target symptoms: Depression     Patient's response to treatment: Active Listening, Self-disclosure, Frequent Questions, and Feedback given to another patient     Progress toward goals: Progressing well     Interval History: Attentive, Verbalized understanding.      Diagnosis: MDD     Plan: Continue treatment on BMU

## 2024-05-02 NOTE — PROGRESS NOTES
Recreation Therapy Progress Note    Date:  05/02/2024    Time:  1400    Group Title:  Leisure skills in creative expression    Mood:  Less anxious less depressed and more hopeful    Behavior:  Participated more in group and able to focused better    Affect:  Patient less depressed and less anxious.  Patient is more calm    Speech:  Patient talking more in group with peers and staff    Cognition:  Patient able to focused on cognitive activity better today less one-to-one from staff needed to make good choices to complete assignment to the best of his ability and use his strengths    Participation Level:  100%    Intervention:  Continue RT services          Recreation Therapist   Signature:  Eva Marcus ma ctrs

## 2024-05-02 NOTE — GROUP NOTE
Group Psychotherapy       Group Focus: Psychodynamic Group Psychotherapy      Number of patients in attendance: 7    Group Start Time: 0900  Group End Time:  0945  Groups Date: 5/2/2024  Group Topic:  Behavioral Health  Group Department: [facility]  Group Facilitators:  Lan Bergman LCSW  _____________________________________________________________________    Patient Name: Luis Salcido  MRN: [id number]  Patient Class: IP- Psych   Admission Date\Time: 4/27/2024  9:25 AM  Hospital Length of Stay: 5  Primary Care Provider: Karolina, Primary Doctor     Referred by: Behavioral Medicine Unit Treatment Team     Target symptoms: Alcohol Abuse     Patient's response to treatment: Active Listening and Self-disclosure     Progress toward goals: Progressing adequately     Interval History: Group explored the kinds of lies we tell ourselves to justify negative behaviors and attitudes.  Discussed the importance of being honest with ourselves about who and what we are.     Diagnosis:      Plan: Continue treatment on BMU

## 2024-05-03 VITALS
HEIGHT: 77 IN | SYSTOLIC BLOOD PRESSURE: 137 MMHG | RESPIRATION RATE: 18 BRPM | WEIGHT: 178.13 LBS | OXYGEN SATURATION: 100 % | BODY MASS INDEX: 21.03 KG/M2 | TEMPERATURE: 98 F | HEART RATE: 68 BPM | DIASTOLIC BLOOD PRESSURE: 86 MMHG

## 2024-05-03 PROCEDURE — 25000003 PHARM REV CODE 250: Performed by: PSYCHIATRY & NEUROLOGY

## 2024-05-03 RX ADMIN — ACAMPROSATE CALCIUM 333 MG: 333 TABLET, DELAYED RELEASE ORAL at 08:05

## 2024-05-03 RX ADMIN — BUSPIRONE HYDROCHLORIDE 5 MG: 5 TABLET ORAL at 08:05

## 2024-05-03 RX ADMIN — ESCITALOPRAM OXALATE 20 MG: 10 TABLET ORAL at 08:05

## 2024-05-03 NOTE — CARE UPDATE
D/C and referral packet faxed to [facility].  Pt will be transported home by [employer].

## 2024-05-03 NOTE — NURSING
"Daily Nursing Note:      Behavior:    Patient (Luis Salcido is a 24 y.o. male, : 2000, MRN: [id number]) demonstrating an affect that was anxious. Luis demonstrating mood that is anxious. Luis had an appearance that was clean. Luis denies suicidal ideation. Luis denies suicide plan. Luis denies homicidal ideation. Luis denies hallucinations.    Luis's  height is 6' 5" (1.956 m) and weight is 80.8 kg (178 lb 2.1 oz). His temperature is 98.1 °F (36.7 °C). His blood pressure is 141/94 (abnormal) and his pulse is 64. His respiration is 16 and oxygen saturation is 99%.     Luis's last BM was noted on: _24______      Intervention:    Encourage Luis to perform self-hygiene, grooming, and changing of clothing. Monitor Luis's behavior and program compliance. Monitor Luis for suicidal ideation, homicidal ideation, sleep disturbance, and hallucinations. Encourage Luis to eat all portions of meals and assess for meal preferences. Monitor Luis for intake and output to ensure hydration. Notify the Physician/Physician Assistant/Advance Practice Registered Nurse (MD/PA/APRN) for any medication refusal and any change in patient condition.      Response:    Luis verbalizes understand of unit process and procedures.   Plan:     Continue to monitor per MD/PA/APRN orders; maintain patient safety.  "

## 2024-05-03 NOTE — PROGRESS NOTES
Hospital day 5.     24-year-old  male who at this time was interviewed today.  Patient was appears to be tolerating completion of detoxification difficulties.  He was not verbally expressing any aggression towards others.      Today his reporting he describes at this time feeling better.  Discussed with him the importance of maintaining engagement into some type of structured work life.  The importance of pursuing work once he was out of the hospital.  Discussed with him how continued utilization of substances particularly alcohol or any other substances he was undermined his ability to to maintain sobriety we will only lead to likelihood of him losing work.      Patient was was more in agreement.  He states his anxiety level appears to be better controlled.  He states his sleep is better controlled.  He denies intention to harm self harm others.  Reinforced the benefits of going forward to some level of substance treatment post discharge    On mental status examination:  24-year-old  male whose affect continues to be somewhat constricted.  Whose speech was good articulation and execution.  His thought processes this time were essentially linear and could be tracked.  His thought content demonstrated no clear evidence auditory hallucinations.  There was no evidence of any visual hallucinations.  He made no active statements to harm self or harm others.  He did not show delusions or distortions in his overall reality testing.  His insight and judgment deemed to be limited but adequate for engagement with outpatient level of care.      Impression:  Homicidal ideation resolved   Major depressive disorder recurrent moderate to severe without psychotic features   Alcohol use disorder   Nonadherence    Estimated continued hospitalization 24 hours   Indications:  Patient was this time we will need ongoing management and coordination of outpatient services for substance treatment and mental  health treatment.  Then can be successfully discharge patient was need of completion of detoxification     Recommendations:  1. We will look towards completion of utilization downward titration of Ativan tonight.    2. Continue trials of Lexapro 20 mg p.o. q.a.m.   3. We will look towards discontinuation of Catapres in the next dosing.    4. We will increase doses of BuSpar to 5 mg p.o. t.i.d. to help mitigate difficulties in terms of anxiety upon discharge.  5. Patient was has been started on trials of Campral 333 mg p.o. t.i.d. to try to mitigate difficulties in terms of cravings for alcohol post discharge   6. Have strongly encouraged the patient to consider participation in outpatient level of care for substance treated with being [facility] level of care versus outpatient individuals with substance abuse treatment counselor.    Anticipate discharge tomorrow.  Patient was appears to be stable where he could tolerate the discharge.

## 2024-05-03 NOTE — NURSING
Discharge Note:    Luis Salcido is a 24 y.o. male, : 2000, MRN: [id number], admitted on 2024 for Ran Crespo MD with a diagnosis of Homicidal ideations [R45.850].    Patient discharged home via [facility] in stable condition. Patient denied suicidal ideation, homicidal ideation, or hallucinations.  Patient medication compliant with no c/o side effects.   Patient was discharged with valuables, personal belongings, prescriptions, discharge instructions, and an educational handout explaining the diagnosis and prescribed medications. Patient verbalized understanding of the discharge instructions and importance of follow-up visits.     Patient discharged on the following medications:     Medication List        START taking these medications      acamprosate 333 mg tablet  Commonly known as: CAMPRAL  Take 1 tablet (333 mg total) by mouth 3 (three) times daily.     busPIRone 5 MG Tab  Commonly known as: BUSPAR  Take 1 tablet (5 mg total) by mouth 3 (three) times daily.     cloNIDine 0.1 MG tablet  Commonly known as: CATAPRES  Take 1 tablet (0.1 mg total) by mouth nightly.     EScitalopram oxalate 20 MG tablet  Commonly known as: LEXAPRO  Take 1 tablet (20 mg total) by mouth once daily.               Where to Get Your Medications        These medications were sent to [facility]/pharmacy #[id number] - [place] - [address] [facility]  [address] [facility] [address]      Phone: [phone]   acamprosate 333 mg tablet  busPIRone 5 MG Tab  cloNIDine 0.1 MG tablet  EScitalopram oxalate 20 MG tablet
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
## 2024-09-20 ENCOUNTER — HOSPITAL ENCOUNTER (EMERGENCY)
Facility: HOSPITAL | Age: 24
Discharge: HOME | End: 2024-09-20
Attending: STUDENT IN AN ORGANIZED HEALTH CARE EDUCATION/TRAINING PROGRAM
Payer: MEDICAID

## 2024-09-20 VITALS
SYSTOLIC BLOOD PRESSURE: 126 MMHG | OXYGEN SATURATION: 100 % | WEIGHT: 175 LBS | BODY MASS INDEX: 20.66 KG/M2 | HEART RATE: 52 BPM | HEIGHT: 77 IN | DIASTOLIC BLOOD PRESSURE: 79 MMHG | TEMPERATURE: 99 F | RESPIRATION RATE: 18 BRPM

## 2024-09-20 DIAGNOSIS — K12.2 MOUTH ABSCESS: Primary | ICD-10-CM

## 2024-09-20 PROCEDURE — 99284 EMERGENCY DEPT VISIT MOD MDM: CPT | Mod: 25

## 2024-09-20 PROCEDURE — 41800 DRAINAGE OF GUM LESION: CPT

## 2024-09-20 PROCEDURE — 63600175 PHARM REV CODE 636 W HCPCS: Mod: JZ,JG | Performed by: STUDENT IN AN ORGANIZED HEALTH CARE EDUCATION/TRAINING PROGRAM

## 2024-09-20 RX ORDER — BUPIVACAINE HYDROCHLORIDE 2.5 MG/ML
2 INJECTION, SOLUTION EPIDURAL; INFILTRATION; INTRACAUDAL
Status: COMPLETED | OUTPATIENT
Start: 2024-09-20 | End: 2024-09-20

## 2024-09-20 RX ORDER — CHLORHEXIDINE GLUCONATE ORAL RINSE 1.2 MG/ML
15 SOLUTION DENTAL 2 TIMES DAILY
Qty: 420 ML | Refills: 0 | Status: SHIPPED | OUTPATIENT
Start: 2024-09-20 | End: 2024-10-04

## 2024-09-20 RX ORDER — CLINDAMYCIN HYDROCHLORIDE 150 MG/1
450 CAPSULE ORAL EVERY 8 HOURS
Qty: 63 CAPSULE | Refills: 0 | Status: SHIPPED | OUTPATIENT
Start: 2024-09-20 | End: 2024-09-27

## 2024-09-20 RX ADMIN — BUPIVACAINE HYDROCHLORIDE 5 MG: 2.5 INJECTION, SOLUTION EPIDURAL; INFILTRATION; INTRACAUDAL at 10:09

## 2024-09-20 NOTE — ED PROVIDER NOTES
"Encounter Date: 9/20/2024       History     Chief Complaint   Patient presents with    Abscess     Pt complaint of worsenging "abscess to left gum" that is causing some facial swelling     HPI    24-year-old male with a past medical history psychiatric disorder presents emergency department for an abscess to his left upper gumline that started yesterday.  Patient denies any fever.  States he wants it drained.    Review of patient's allergies indicates:  No Known Allergies  Past Medical History:   Diagnosis Date    Alcohol abuse     Depression     History of psychiatric hospitalization     Hx of psychiatric care     Panic disorder     Psychiatric problem     Suicide attempt     Withdrawal symptoms, alcohol      No past surgical history on file.  No family history on file.  Social History     Tobacco Use    Smoking status: Every Day     Types: Vaping with nicotine    Smokeless tobacco: Never   Substance Use Topics    Alcohol use: Yes    Drug use: Yes     Types: Marijuana     Review of Systems   Constitutional:  Negative for fever.   HENT:  Positive for dental problem.    Respiratory:  Negative for cough.    Cardiovascular:  Negative for chest pain.   Gastrointestinal:  Negative for abdominal pain, constipation, diarrhea, nausea and vomiting.   Neurological:  Negative for headaches.   All other systems reviewed and are negative.      Physical Exam     Initial Vitals [09/20/24 0926]   BP Pulse Resp Temp SpO2   126/79 (!) 52 18 98.8 °F (37.1 °C) 100 %      MAP       --         Physical Exam    Nursing note and vitals reviewed.  Constitutional: He appears well-developed and well-nourished. No distress.   HENT:   There is a small 1 x 0.5 cm abscess to the left upper gumline near the lateral incisor   Cardiovascular:  Normal rate and regular rhythm.           Pulmonary/Chest: Breath sounds normal. No respiratory distress.   Abdominal: Abdomen is soft. There is no abdominal tenderness.   Musculoskeletal:         General: No " tenderness. Normal range of motion.     Neurological: He is alert and oriented to person, place, and time.   Skin: Skin is warm. Capillary refill takes less than 2 seconds.         ED Course   I & D - Incision and Drainage    Date/Time: 9/20/2024 9:55 AM  Location procedure was performed: [facility] EMERGENCY DEPARTMENT    Performed by: Adebayo Buitrago MD  Authorized by: Adebayo Buitrago MD  Consent Done: Yes  Consent: Verbal consent obtained.  Risks and benefits: risks, benefits and alternatives were discussed  Consent given by: patient  Type: abscess  Body area: mouth  Location details: alveolar process  Anesthesia: local infiltration    Anesthesia:  Local Anesthetic: bupivacaine 0.25% without epinephrine  Anesthetic total: 1 mL  Scalpel size: 11  Incision type: single straight  Incision depth: dermal  Complexity: simple  Drainage: pus  Drainage amount: moderate  Wound treatment: incision, drainage and expression of material  Packing material: none  Complications: No  Patient tolerance: Patient tolerated the procedure well with no immediate complications    Incision depth: dermal        Labs Reviewed - No data to display       Imaging Results    None          Medications   BUPivacaine (PF) 0.25% (2.5 mg/ml) injection 5 mg (has no administration in time range)     Medical Decision Making  Initial Assessment:       Mouth abscess      Differential Diagnosis:   Judging by the patient's chief complaint and pertinent history, the patient has the following possible differential diagnoses, including but not limited to the following.  Some of these are deemed to be lower likelihood and some more likely based on my physical exam and history combined with possible lab work and/or imaging studies.   Please see the pertinent studies, and refer to the HPI.  Some of these diagnoses will take further evaluation to fully rule out, perhaps as an outpatient and the patient was encouraged to follow up when discharged for more  comprehensive evaluation.      Dental abscess, dull infection, dental pain,  as well as multiple other possible etiologies      Problems Addressed:  Mouth abscess: acute illness or injury    Risk  Prescription drug management.                                      Clinical Impression:  Final diagnoses:  [K12.2] Mouth abscess (Primary)          ED Disposition Condition    Discharge Stable          ED Prescriptions       Medication Sig Dispense Start Date End Date Auth. Provider    clindamycin (CLEOCIN) 150 MG capsule Take 3 capsules (450 mg total) by mouth every 8 (eight) hours. for 7 days 63 capsule 9/20/2024 9/27/2024 Adebayo Buitrago MD    chlorhexidine (PERIDEX) 0.12 % solution Use as directed 15 mLs in the mouth or throat 2 (two) times daily. for 14 days 420 mL 9/20/2024 10/4/2024 Adebayo Buitrago MD          Follow-up Information       Follow up With Specialties Details Why Contact Info    [facility] - Emergency Dept Emergency Medicine Go to  If symptoms worsen [id number]  [facility] [id number] [phone]    Follow-up with dentist                 Adebayo Buitrago MD  09/20/24 1700

## 2024-10-25 ENCOUNTER — PATIENT MESSAGE (OUTPATIENT)
Dept: RESEARCH | Facility: HOSPITAL | Age: 24
End: 2024-10-25
Payer: MEDICAID

## 2024-12-04 ENCOUNTER — HOSPITAL ENCOUNTER (EMERGENCY)
Facility: HOSPITAL | Age: 24
Discharge: HOME | End: 2024-12-04
Attending: EMERGENCY MEDICINE
Payer: MEDICAID

## 2024-12-04 VITALS
HEIGHT: 77 IN | WEIGHT: 165.38 LBS | TEMPERATURE: 98 F | HEART RATE: 95 BPM | RESPIRATION RATE: 16 BRPM | BODY MASS INDEX: 19.53 KG/M2 | DIASTOLIC BLOOD PRESSURE: 98 MMHG | SYSTOLIC BLOOD PRESSURE: 143 MMHG | OXYGEN SATURATION: 100 %

## 2024-12-04 DIAGNOSIS — S69.91XA FINGER INJURY, RIGHT, INITIAL ENCOUNTER: Primary | ICD-10-CM

## 2024-12-04 PROCEDURE — 25000003 PHARM REV CODE 250: Performed by: NURSE PRACTITIONER

## 2024-12-04 PROCEDURE — 99284 EMERGENCY DEPT VISIT MOD MDM: CPT | Mod: 25

## 2024-12-04 RX ORDER — BACLOFEN 10 MG/1
10 TABLET ORAL 3 TIMES DAILY PRN
Qty: 21 TABLET | Refills: 0 | Status: SHIPPED | OUTPATIENT
Start: 2024-12-04

## 2024-12-04 RX ORDER — DICLOFENAC SODIUM 75 MG/1
75 TABLET, DELAYED RELEASE ORAL 2 TIMES DAILY
Qty: 14 TABLET | Refills: 0 | Status: SHIPPED | OUTPATIENT
Start: 2024-12-04 | End: 2024-12-11

## 2024-12-04 RX ORDER — KETOROLAC TROMETHAMINE 10 MG/1
10 TABLET, FILM COATED ORAL
Status: COMPLETED | OUTPATIENT
Start: 2024-12-04 | End: 2024-12-04

## 2024-12-04 RX ADMIN — KETOROLAC TROMETHAMINE 10 MG: 10 TABLET, FILM COATED ORAL at 10:12

## 2024-12-04 NOTE — ED PROVIDER NOTES
Encounter Date: 12/4/2024       History     Chief Complaint   Patient presents with    Finger Injury     RT 5TH FINGER INJURY WHILE PLAYING BASKET BALL LAST PM.  SWELLING NOTED.      Patient is a 24-year-old male who presents to the [facility] emergency room for evaluation of his right 5th finger.  Report striking basketball last night.  Denies loss of sensation or open wounds to affected finger.  Denies relief in pain with over-the-counter medications.  History depression and substance abuse.      Review of patient's allergies indicates:  No Known Allergies  Past Medical History:   Diagnosis Date    Alcohol abuse     Depression     History of psychiatric hospitalization     Hx of psychiatric care     Panic disorder     Psychiatric problem     Suicide attempt     Withdrawal symptoms, alcohol      History reviewed. No pertinent surgical history.  No family history on file.  Social History     Tobacco Use    Smoking status: Former     Types: Vaping with nicotine    Smokeless tobacco: Never   Substance Use Topics    Alcohol use: Yes    Drug use: Yes     Types: Marijuana     Review of Systems   Constitutional:  Negative for chills, diaphoresis, fatigue and fever.   HENT:  Negative for facial swelling, postnasal drip, rhinorrhea, sinus pressure, sinus pain, sore throat and trouble swallowing.    Respiratory:  Negative for cough, chest tightness, shortness of breath and wheezing.    Cardiovascular:  Negative for chest pain, palpitations and leg swelling.   Gastrointestinal:  Negative for abdominal pain, diarrhea, nausea and vomiting.   Genitourinary:  Negative for dysuria, flank pain, hematuria and urgency.   Musculoskeletal:  Positive for arthralgias and myalgias. Negative for back pain.   Skin:  Negative for color change and rash.   Neurological:  Negative for dizziness, syncope, weakness and headaches.   Hematological:  Does not bruise/bleed easily.   All other systems reviewed and are negative.      Physical Exam      Initial Vitals [12/04/24 1012]   BP Pulse Resp Temp SpO2   (!) 143/98 97 16 98.2 °F (36.8 °C) 100 %      MAP       --         Physical Exam    Nursing note and vitals reviewed.  Constitutional: Vital signs are normal. He appears well-developed and well-nourished.   HENT:   Head: Normocephalic.   Nose: Nose normal. Mouth/Throat: Oropharynx is clear and moist.   Eyes: Conjunctivae and EOM are normal. Pupils are equal, round, and reactive to light.   Neck: Neck supple.   Normal range of motion.  Cardiovascular:  Normal rate, regular rhythm, normal heart sounds and intact distal pulses.           Pulmonary/Chest: Effort normal and breath sounds normal. No respiratory distress. He has no wheezes. He has no rhonchi. He has no rales. He exhibits no tenderness.   Abdominal: Abdomen is soft and flat. Bowel sounds are normal. There is no abdominal tenderness. There is no rebound, no guarding, no tenderness at McBurney's point and negative Wahl's sign.   Musculoskeletal:         General: Normal range of motion.      Right hand: Swelling and tenderness present. There is no disruption of two-point discrimination. Normal capillary refill. Normal pulse.        Hands:       Cervical back: Normal range of motion and neck supple.     Neurological: He is alert and oriented to person, place, and time. He has normal strength.   Skin: Skin is warm and dry. Capillary refill takes less than 2 seconds.   Psychiatric: He has a normal mood and affect. His behavior is normal. Judgment and thought content normal.         ED Course   Procedures  Labs Reviewed - No data to display       Imaging Results              X-Ray Hand 3 View Right (Final result)  Result time 12/04/24 10:52:48      Final result by Scout Elkins MD (12/04/24 10:52:48)                   Impression:      No acute osseous abnormality, fracture, or dislocation.    There is no significant degenerative change.      Electronically signed by: Scout  Severo  Date:    12/04/2024  Time:    10:52               Narrative:    EXAMINATION:  XR HAND COMPLETE 3 VIEW RIGHT    CLINICAL HISTORY:  right hand injury;    TECHNIQUE:  Radiographs of the right hand with AP, lateral and oblique  views.    COMPARISON:  12/12/2014    FINDINGS:  There is no acute fracture, subluxation or dislocation.    Joints and interspaces appear maintained.    Remote posttraumatic changes of the distal 5th metacarpal with no acute fracture appreciated    Soft tissues are unremarkable.    There are no radiopaque foreign bodies.                                       Medications   ketorolac tablet 10 mg (10 mg Oral Given 12/4/24 1051)     Medical Decision Making  Differential:  Fracture   Strain  Contusion    Amount and/or Complexity of Data Reviewed  Radiology: ordered.    Risk  Prescription drug management.               ED Course as of 12/04/24 1139   Wed Dec 04, 2024   1137 Given strict ED return precautions. I have spoken with the patient and/or caregivers. I have explained the patient's condition, diagnoses and treatment plan based on the information available to me at this time. I have answered the patient's and/or caregiver's questions and addressed any concerns. The patient and/or caregivers have as good an understanding of the patient's diagnosis, condition and treatment plan as can be expected at this point. The vital signs have been stable. The patient's condition is stable and appropriate for discharge from the emergency department.      The patient will pursue further outpatient evaluation with the primary care physician or other designated or consulting physician as outlined in the discharge instructions. The patient and/or caregivers are agreeable to this plan of care and follow-up instructions have been explained in detail. The patient and/or caregivers have received these instructions in written format and have expressed an understanding of the discharge instructions. The patient  and/or caregivers are aware that any significant change in condition or worsening of symptoms should prompt an immediate return to this or the closest emergency department or a call to 911.   [JA]      ED Course User Index  [JA] Glen Vilchis Jr., FNP                           Clinical Impression:  Final diagnoses:  [S69.91XA] Finger injury, right, initial encounter (Primary)          ED Disposition Condition    Discharge Stable          ED Prescriptions       Medication Sig Dispense Start Date End Date Auth. Provider    diclofenac (VOLTAREN) 75 MG EC tablet Take 1 tablet (75 mg total) by mouth 2 (two) times daily. for 7 days 14 tablet 12/4/2024 12/11/2024 Glen Vilchis Jr., FNP    baclofen (LIORESAL) 10 MG tablet Take 1 tablet (10 mg total) by mouth 3 (three) times daily as needed (muscle spasms). 21 tablet 12/4/2024 -- Glen Vilchis Jr., FNP          Follow-up Information       Follow up With Specialties Details Why Contact Info    [facility] - Emergency Dept Emergency Medicine In 3 days As needed, If symptoms worsen [address] [address] [phone]             Glen Vilchis Jr., FNP  12/04/24 [id number]